# Patient Record
Sex: FEMALE | Race: WHITE | NOT HISPANIC OR LATINO | Employment: FULL TIME | RURAL
[De-identification: names, ages, dates, MRNs, and addresses within clinical notes are randomized per-mention and may not be internally consistent; named-entity substitution may affect disease eponyms.]

---

## 2022-03-28 ENCOUNTER — OFFICE VISIT (OUTPATIENT)
Dept: FAMILY MEDICINE CLINIC | Facility: CLINIC | Age: 37
End: 2022-03-28

## 2022-03-28 VITALS
WEIGHT: 293 LBS | BODY MASS INDEX: 44.41 KG/M2 | TEMPERATURE: 98.4 F | HEART RATE: 94 BPM | SYSTOLIC BLOOD PRESSURE: 122 MMHG | RESPIRATION RATE: 20 BRPM | DIASTOLIC BLOOD PRESSURE: 78 MMHG | OXYGEN SATURATION: 99 % | HEIGHT: 68 IN

## 2022-03-28 DIAGNOSIS — Z00.00 ROUTINE GENERAL MEDICAL EXAMINATION AT A HEALTH CARE FACILITY: ICD-10-CM

## 2022-03-28 DIAGNOSIS — F41.9 ANXIETY: ICD-10-CM

## 2022-03-28 DIAGNOSIS — Z11.59 NEED FOR HEPATITIS C SCREENING TEST: Primary | ICD-10-CM

## 2022-03-28 PROBLEM — G47.30 SLEEP APNEA: Status: ACTIVE | Noted: 2022-03-28

## 2022-03-28 PROCEDURE — 99203 OFFICE O/P NEW LOW 30 MIN: CPT | Performed by: FAMILY MEDICINE

## 2022-03-28 RX ORDER — CITALOPRAM 20 MG/1
20 TABLET ORAL EVERY MORNING
Qty: 30 TABLET | Refills: 5 | Status: SHIPPED | OUTPATIENT
Start: 2022-03-28 | End: 2022-09-28 | Stop reason: SDUPTHER

## 2022-03-28 NOTE — PROGRESS NOTES
"Miley Deejay    1985    Chief Complaint   Patient presents with   • Establish Care     New Patient needing Bp control due to work stress       Vitals:    03/28/22 1240   BP: 122/78   BP Location: Left arm   Patient Position: Sitting   Cuff Size: Large Adult   Pulse: 94   Resp: 20   Temp: 98.4 °F (36.9 °C)   TempSrc: Temporal   SpO2: 99%   Weight: (!) 142 kg (314 lb)   Height: 172.7 cm (68\")   PainSc: 0-No pain       36-year-old morbidly obese patient in high stress full job with anxiety      Review of Systems   Respiratory: Positive for apnea.         Sleep apnea uses CPAP with compliance can do that uses it every night will need to rest for life because of the weight issue   Cardiovascular: Negative for chest pain and leg swelling.   Genitourinary:        GYN checkup 2 months ago   Neurological: Negative for dizziness and headaches.   Psychiatric/Behavioral: Negative for suicidal ideas. The patient is nervous/anxious.        Past Medical History:   Diagnosis Date   • Other sleep apnea          Current Outpatient Medications:   •  citalopram (CeleXA) 20 MG tablet, Take 1 tablet by mouth Every Morning., Disp: 30 tablet, Rfl: 5    No Known Allergies    Patient Active Problem List   Diagnosis   • Sleep apnea       Social History     Socioeconomic History   • Marital status:    Tobacco Use   • Smoking status: Former Smoker     Packs/day: 1.00     Years: 13.00     Pack years: 13.00     Types: Cigarettes   • Smokeless tobacco: Never Used   Vaping Use   • Vaping Use: Never used   Substance and Sexual Activity   • Alcohol use: Yes     Comment: couple drinks a year   • Drug use: Never   • Sexual activity: Defer     Partners: Female, Male       Past Surgical History:   Procedure Laterality Date   • KNEE CARTILAGE SURGERY Right        Physical Exam  Vitals and nursing note reviewed.   Constitutional:       Appearance: She is obese.   Eyes:      Extraocular Movements: Extraocular movements intact.      Pupils: " "Pupils are equal, round, and reactive to light.   Cardiovascular:      Rate and Rhythm: Normal rate and regular rhythm.   Pulmonary:      Effort: Pulmonary effort is normal.      Breath sounds: Normal breath sounds.   Musculoskeletal:      Right lower leg: No edema.      Left lower leg: No edema.   Skin:     General: Skin is warm and dry.   Neurological:      General: No focal deficit present.      Mental Status: She is alert and oriented to person, place, and time.   Psychiatric:         Behavior: Behavior normal.         Thought Content: Thought content normal.         Judgment: Judgment normal.      Comments: Mood somewhat labile         Vitals:    03/28/22 1240   BP: 122/78   BP Location: Left arm   Patient Position: Sitting   Cuff Size: Large Adult   Pulse: 94   Resp: 20   Temp: 98.4 °F (36.9 °C)   TempSrc: Temporal   SpO2: 99%   Weight: (!) 142 kg (314 lb)   Height: 172.7 cm (68\")     Patient's Body mass index is 47.74 kg/m². indicating that she is morbidly obese (BMI > 40 or > 35 with obesity - related health condition). Obesity-related health conditions include the following: obstructive sleep apnea and osteoarthritis. Obesity is unchanged. BMI is is above average; BMI management plan is completed. We discussed portion control and increasing exercise..      Diagnoses and all orders for this visit:    1. Need for hepatitis C screening test (Primary)    2. Routine general medical examination at a health care facility  -     Hepatitis C Antibody  -     TSH  -     T4, free  -     CBC & Differential  -     Comprehensive Metabolic Panel  -     Lipid Panel    3. Anxiety    Other orders  -     citalopram (CeleXA) 20 MG tablet; Take 1 tablet by mouth Every Morning.  Dispense: 30 tablet; Refill: 5        Problems Addressed this Visit    None     Visit Diagnoses     Need for hepatitis C screening test    -  Primary    Routine general medical examination at a health care facility        Relevant Orders    Hepatitis C " Antibody    TSH    T4, free    CBC & Differential    Comprehensive Metabolic Panel    Lipid Panel    Anxiety          Diagnoses       Codes Comments    Need for hepatitis C screening test    -  Primary ICD-10-CM: Z11.59  ICD-9-CM: V73.89     Routine general medical examination at a health care facility     ICD-10-CM: Z00.00  ICD-9-CM: V70.0     Anxiety     ICD-10-CM: F41.9  ICD-9-CM: 300.00           Health Maintenance:  Immunization History   Administered Date(s) Administered   • Hep B, Adolescent or Pediatric 10/24/2002   • MMR 06/14/2005   • Td 02/03/1997                  Plan above-Mediterranean diet-mother has dementia                Electronically signed by Eugenio Yang MD 03/28/2022

## 2022-03-29 LAB
ALBUMIN SERPL-MCNC: 4.2 G/DL (ref 3.5–5.2)
ALBUMIN/GLOB SERPL: 1.4 G/DL
ALP SERPL-CCNC: 144 U/L (ref 39–117)
ALT SERPL-CCNC: 16 U/L (ref 1–33)
AST SERPL-CCNC: 13 U/L (ref 1–32)
BASOPHILS # BLD AUTO: 0.03 10*3/MM3 (ref 0–0.2)
BASOPHILS NFR BLD AUTO: 0.3 % (ref 0–1.5)
BILIRUB SERPL-MCNC: 0.2 MG/DL (ref 0–1.2)
BUN SERPL-MCNC: 9 MG/DL (ref 6–20)
BUN/CREAT SERPL: 14.3 (ref 7–25)
CALCIUM SERPL-MCNC: 9.6 MG/DL (ref 8.6–10.5)
CHLORIDE SERPL-SCNC: 99 MMOL/L (ref 98–107)
CHOLEST SERPL-MCNC: 173 MG/DL (ref 0–200)
CO2 SERPL-SCNC: 26.5 MMOL/L (ref 22–29)
CREAT SERPL-MCNC: 0.63 MG/DL (ref 0.57–1)
EGFRCR SERPLBLD CKD-EPI 2021: 119.6 ML/MIN/1.73
EOSINOPHIL # BLD AUTO: 0.32 10*3/MM3 (ref 0–0.4)
EOSINOPHIL NFR BLD AUTO: 3 % (ref 0.3–6.2)
ERYTHROCYTE [DISTWIDTH] IN BLOOD BY AUTOMATED COUNT: 13.2 % (ref 12.3–15.4)
GLOBULIN SER CALC-MCNC: 3 GM/DL
GLUCOSE SERPL-MCNC: 84 MG/DL (ref 65–99)
HCT VFR BLD AUTO: 42.4 % (ref 34–46.6)
HCV AB S/CO SERPL IA: <0.1 S/CO RATIO (ref 0–0.9)
HDLC SERPL-MCNC: 42 MG/DL (ref 40–60)
HGB BLD-MCNC: 13.8 G/DL (ref 12–15.9)
IMM GRANULOCYTES # BLD AUTO: 0.03 10*3/MM3 (ref 0–0.05)
IMM GRANULOCYTES NFR BLD AUTO: 0.3 % (ref 0–0.5)
LDLC SERPL CALC-MCNC: 97 MG/DL (ref 0–100)
LYMPHOCYTES # BLD AUTO: 2.92 10*3/MM3 (ref 0.7–3.1)
LYMPHOCYTES NFR BLD AUTO: 27.4 % (ref 19.6–45.3)
MCH RBC QN AUTO: 28.5 PG (ref 26.6–33)
MCHC RBC AUTO-ENTMCNC: 32.5 G/DL (ref 31.5–35.7)
MCV RBC AUTO: 87.4 FL (ref 79–97)
MONOCYTES # BLD AUTO: 0.87 10*3/MM3 (ref 0.1–0.9)
MONOCYTES NFR BLD AUTO: 8.2 % (ref 5–12)
NEUTROPHILS # BLD AUTO: 6.49 10*3/MM3 (ref 1.7–7)
NEUTROPHILS NFR BLD AUTO: 60.8 % (ref 42.7–76)
NRBC BLD AUTO-RTO: 0 /100 WBC (ref 0–0.2)
PLATELET # BLD AUTO: 310 10*3/MM3 (ref 140–450)
POTASSIUM SERPL-SCNC: 4.4 MMOL/L (ref 3.5–5.2)
PROT SERPL-MCNC: 7.2 G/DL (ref 6–8.5)
RBC # BLD AUTO: 4.85 10*6/MM3 (ref 3.77–5.28)
SODIUM SERPL-SCNC: 139 MMOL/L (ref 136–145)
T4 FREE SERPL-MCNC: 1.03 NG/DL (ref 0.93–1.7)
TRIGL SERPL-MCNC: 197 MG/DL (ref 0–150)
TSH SERPL DL<=0.005 MIU/L-ACNC: 1.22 UIU/ML (ref 0.27–4.2)
VLDLC SERPL CALC-MCNC: 34 MG/DL (ref 5–40)
WBC # BLD AUTO: 10.66 10*3/MM3 (ref 3.4–10.8)

## 2022-04-01 ENCOUNTER — PATIENT ROUNDING (BHMG ONLY) (OUTPATIENT)
Dept: FAMILY MEDICINE CLINIC | Facility: CLINIC | Age: 37
End: 2022-04-01

## 2022-04-01 NOTE — PROGRESS NOTES
April 1, 2022    Hello, may I speak with Miley Villalba?    My name is Lisbeth    I am  with [facility]  [address], [address]  [place] [address] [phone].    Before we get started may I verify your date of birth? 1985    I am calling to officially welcome you to our practice and ask about your recent visit. Is this a good time to talk? Yes!    Tell me about your visit with us. What things went well?  I just really appreciate you all so much!!        We're always looking for ways to make our patients' experiences even better. Do you have recommendations on ways we may improve? No    Overall were you satisfied with your first visit to our practice? Yes       I appreciate you taking the time to speak with me today. Is there anything else I can do for you? No        Thank you, and have a great day.

## 2022-04-28 ENCOUNTER — OFFICE VISIT (OUTPATIENT)
Dept: FAMILY MEDICINE CLINIC | Facility: CLINIC | Age: 37
End: 2022-04-28

## 2022-04-28 VITALS
OXYGEN SATURATION: 98 % | HEART RATE: 74 BPM | BODY MASS INDEX: 44.41 KG/M2 | TEMPERATURE: 97.7 F | HEIGHT: 68 IN | WEIGHT: 293 LBS | DIASTOLIC BLOOD PRESSURE: 78 MMHG | SYSTOLIC BLOOD PRESSURE: 136 MMHG

## 2022-04-28 DIAGNOSIS — M54.50 ACUTE MIDLINE LOW BACK PAIN WITHOUT SCIATICA: Primary | ICD-10-CM

## 2022-04-28 PROCEDURE — 99213 OFFICE O/P EST LOW 20 MIN: CPT | Performed by: NURSE PRACTITIONER

## 2022-04-28 PROCEDURE — 96372 THER/PROPH/DIAG INJ SC/IM: CPT | Performed by: NURSE PRACTITIONER

## 2022-04-28 RX ORDER — PREDNISONE 10 MG/1
TABLET ORAL
Qty: 21 TABLET | Refills: 0 | Status: SHIPPED | OUTPATIENT
Start: 2022-04-28 | End: 2022-09-23

## 2022-04-28 RX ORDER — TRIAMCINOLONE ACETONIDE 40 MG/ML
40 INJECTION, SUSPENSION INTRA-ARTICULAR; INTRAMUSCULAR ONCE
Status: COMPLETED | OUTPATIENT
Start: 2022-04-28 | End: 2022-04-28

## 2022-04-28 RX ADMIN — TRIAMCINOLONE ACETONIDE 40 MG: 40 INJECTION, SUSPENSION INTRA-ARTICULAR; INTRAMUSCULAR at 12:52

## 2022-04-28 NOTE — PROGRESS NOTES
"CC: low back pain     History:  Miley Villalba is a 36 y.o. female who presents today for evaluation of the above problems.      Pain in her low mid back for two days.   No known injury.  Staying in any one position for an extended time makes the pain worse, as well as changing positions. She has tried heat, tylenol, and ibuprofen and these have not helped. She has not noticed that the pain radiates down her legs and there is no weakness in her legs.     HPI  ROS:  Review of Systems   Musculoskeletal: Positive for back pain.       No Known Allergies  Past Medical History:   Diagnosis Date   • Other sleep apnea      Past Surgical History:   Procedure Laterality Date   • KNEE CARTILAGE SURGERY Right      Family History   Problem Relation Age of Onset   • Alzheimer's disease Mother    • Diabetes Father    • Diabetes Maternal Aunt    • Diabetes Maternal Uncle    • Diabetes Paternal Aunt    • Diabetes Paternal Uncle       reports that she quit smoking about 8 years ago. Her smoking use included cigarettes. She has a 13.00 pack-year smoking history. She has never used smokeless tobacco. She reports current alcohol use. She reports that she does not use drugs.      Current Outpatient Medications:   •  citalopram (CeleXA) 20 MG tablet, Take 1 tablet by mouth Every Morning., Disp: 30 tablet, Rfl: 5  •  diclofenac (VOLTAREN) 50 MG EC tablet, Take 1 tablet by mouth 2 (Two) Times a Day., Disp: 28 tablet, Rfl: 0  •  predniSONE (DELTASONE) 10 MG (21) dose pack, Use as directed on package, Disp: 21 tablet, Rfl: 0    Current Facility-Administered Medications:   •  triamcinolone acetonide (KENALOG-40) injection 40 mg, 40 mg, Intramuscular, Once, Shivani Miller, APRN    OBJECTIVE:  /78 (BP Location: Left arm, Patient Position: Sitting, Cuff Size: Large Adult)   Pulse 74   Temp 97.7 °F (36.5 °C) (Temporal)   Ht 172.7 cm (68\")   Wt (!) 143 kg (316 lb)   SpO2 98%   Breastfeeding No   BMI 48.05 kg/m²    Physical " Exam  Vitals reviewed.   Constitutional:       Appearance: She is well-developed.   Cardiovascular:      Rate and Rhythm: Normal rate.   Pulmonary:      Effort: Pulmonary effort is normal.   Musculoskeletal:        Back:    Neurological:      Mental Status: She is alert and oriented to person, place, and time.   Psychiatric:         Behavior: Behavior normal.         Assessment/Plan    Diagnoses and all orders for this visit:    1. Acute midline low back pain without sciatica (Primary)  -     triamcinolone acetonide (KENALOG-40) injection 40 mg  -     diclofenac (VOLTAREN) 50 MG EC tablet; Take 1 tablet by mouth 2 (Two) Times a Day.  Dispense: 28 tablet; Refill: 0  -     predniSONE (DELTASONE) 10 MG (21) dose pack; Use as directed on package  Dispense: 21 tablet; Refill: 0        An After Visit Summary was printed and given to the patient at discharge.  Return if symptoms worsen or fail to improve, for Next scheduled follow up.       ARYA Jacques 4/28/22    Electronically signed.

## 2022-09-23 ENCOUNTER — OFFICE VISIT (OUTPATIENT)
Dept: FAMILY MEDICINE CLINIC | Facility: CLINIC | Age: 37
End: 2022-09-23

## 2022-09-23 VITALS
HEART RATE: 77 BPM | RESPIRATION RATE: 20 BRPM | WEIGHT: 293 LBS | BODY MASS INDEX: 44.41 KG/M2 | HEIGHT: 68 IN | OXYGEN SATURATION: 97 % | DIASTOLIC BLOOD PRESSURE: 87 MMHG | TEMPERATURE: 98.6 F | SYSTOLIC BLOOD PRESSURE: 138 MMHG

## 2022-09-23 DIAGNOSIS — R05.9 COUGH: ICD-10-CM

## 2022-09-23 DIAGNOSIS — J02.9 SORE THROAT: Primary | ICD-10-CM

## 2022-09-23 LAB
EXPIRATION DATE: NORMAL
EXPIRATION DATE: NORMAL
FLUAV AG UPPER RESP QL IA.RAPID: NOT DETECTED
FLUBV AG UPPER RESP QL IA.RAPID: NOT DETECTED
INTERNAL CONTROL: NORMAL
INTERNAL CONTROL: NORMAL
Lab: NORMAL
Lab: NORMAL
S PYO AG THROAT QL: NEGATIVE
SARS-COV-2 AG UPPER RESP QL IA.RAPID: NOT DETECTED

## 2022-09-23 PROCEDURE — 87880 STREP A ASSAY W/OPTIC: CPT | Performed by: FAMILY MEDICINE

## 2022-09-23 PROCEDURE — 87428 SARSCOV & INF VIR A&B AG IA: CPT | Performed by: FAMILY MEDICINE

## 2022-09-23 PROCEDURE — 99213 OFFICE O/P EST LOW 20 MIN: CPT | Performed by: FAMILY MEDICINE

## 2022-09-23 RX ORDER — MULTIPLE VITAMINS W/ MINERALS TAB 9MG-400MCG
1 TAB ORAL DAILY
COMMUNITY

## 2022-09-23 RX ORDER — AZITHROMYCIN 250 MG/1
TABLET, FILM COATED ORAL
Qty: 6 TABLET | Refills: 0 | Status: SHIPPED | OUTPATIENT
Start: 2022-09-23 | End: 2022-09-28

## 2022-09-23 NOTE — PROGRESS NOTES
Subjective   Miley Villalba is a 36 y.o. female.     History of Present Illness  46-year-old female with sore throat cough congestion      The following portions of the patient's history were reviewed and updated as appropriate: allergies, current medications, past family history, past medical history, past social history, past surgical history and problem list.    Review of Systems   HENT: Positive for ear pain, postnasal drip, sinus pressure and sore throat.    Respiratory: Positive for cough. Negative for shortness of breath.    Cardiovascular: Negative for chest pain and leg swelling.       Objective   Physical Exam  Vitals and nursing note reviewed.   Constitutional:       Appearance: She is obese.   HENT:      Right Ear: Tympanic membrane and ear canal normal.      Left Ear: Tympanic membrane and ear canal normal.      Mouth/Throat:      Mouth: Mucous membranes are moist.      Pharynx: Oropharynx is clear.   Cardiovascular:      Rate and Rhythm: Normal rate and regular rhythm.   Pulmonary:      Effort: Pulmonary effort is normal.      Breath sounds: Rhonchi present.   Musculoskeletal:      Right lower leg: No edema.      Left lower leg: No edema.   Neurological:      General: No focal deficit present.      Mental Status: She is alert and oriented to person, place, and time.   Psychiatric:         Mood and Affect: Mood normal.         Behavior: Behavior normal.         Thought Content: Thought content normal.         Judgment: Judgment normal.         Assessment & Plan   Diagnoses and all orders for this visit:    1. Sore throat (Primary)  -     POC Rapid Strep A    2. Cough  -     POCT SARS-CoV-2 Antigen JACQUELYN    Other orders  -     azithromycin (Zithromax Z-Francisco) 250 MG tablet; Take 2 tablets the first day, then 1 tablet daily for 4 days.  Dispense: 6 tablet; Refill: 0         Plan above-COVID strep and flu negative

## 2022-09-28 ENCOUNTER — OFFICE VISIT (OUTPATIENT)
Dept: FAMILY MEDICINE CLINIC | Facility: CLINIC | Age: 37
End: 2022-09-28

## 2022-09-28 ENCOUNTER — TELEPHONE (OUTPATIENT)
Dept: FAMILY MEDICINE CLINIC | Facility: CLINIC | Age: 37
End: 2022-09-28

## 2022-09-28 VITALS
WEIGHT: 293 LBS | HEART RATE: 73 BPM | HEIGHT: 68 IN | TEMPERATURE: 98.2 F | DIASTOLIC BLOOD PRESSURE: 68 MMHG | RESPIRATION RATE: 16 BRPM | BODY MASS INDEX: 44.41 KG/M2 | OXYGEN SATURATION: 97 % | SYSTOLIC BLOOD PRESSURE: 112 MMHG

## 2022-09-28 DIAGNOSIS — E78.2 MIXED HYPERLIPIDEMIA: ICD-10-CM

## 2022-09-28 DIAGNOSIS — F41.9 ANXIETY: Primary | ICD-10-CM

## 2022-09-28 PROCEDURE — 99213 OFFICE O/P EST LOW 20 MIN: CPT | Performed by: FAMILY MEDICINE

## 2022-09-28 RX ORDER — CITALOPRAM 40 MG/1
40 TABLET ORAL EVERY MORNING
Qty: 90 TABLET | Refills: 3 | Status: SHIPPED | OUTPATIENT
Start: 2022-09-28

## 2022-09-28 NOTE — PROGRESS NOTES
Subjective   Miley Villalba is a 36 y.o. female.     History of Present Illness  36-year-old female with extreme anxiety and mood disorder related to having to institutionalized her 15-year-old daughter-daughter has failed outpatient therapy and at risk for suicide      The following portions of the patient's history were reviewed and updated as appropriate: allergies, current medications, past family history, past medical history, past social history, past surgical history and problem list.    Review of Systems   Psychiatric/Behavioral: Negative for suicidal ideas. The patient is nervous/anxious.        Objective   Physical Exam  Vitals and nursing note reviewed.   Constitutional:       Appearance: She is obese.   Eyes:      Extraocular Movements: Extraocular movements intact.      Pupils: Pupils are equal, round, and reactive to light.   Pulmonary:      Effort: Pulmonary effort is normal.   Neurological:      General: No focal deficit present.      Mental Status: She is alert and oriented to person, place, and time.   Psychiatric:         Behavior: Behavior normal.         Thought Content: Thought content normal.         Judgment: Judgment normal.      Comments: Mood is down crying distraught about her daughter         Assessment & Plan   Diagnoses and all orders for this visit:    1. Anxiety (Primary)    Other orders  -     citalopram (CeleXA) 40 MG tablet; Take 1 tablet by mouth Every Morning.  Dispense: 90 tablet; Refill: 3       Advised flu shot-get enrolled in counseling-increase Celexa to 40 mg

## 2022-09-29 LAB
ALBUMIN SERPL-MCNC: 4.1 G/DL (ref 3.5–5.2)
ALBUMIN/GLOB SERPL: 1.5 G/DL
ALP SERPL-CCNC: 140 U/L (ref 39–117)
ALT SERPL-CCNC: 22 U/L (ref 1–33)
AST SERPL-CCNC: 18 U/L (ref 1–32)
BILIRUB SERPL-MCNC: 0.4 MG/DL (ref 0–1.2)
BUN SERPL-MCNC: 7 MG/DL (ref 6–20)
BUN/CREAT SERPL: 11.7 (ref 7–25)
CALCIUM SERPL-MCNC: 9.7 MG/DL (ref 8.6–10.5)
CHLORIDE SERPL-SCNC: 102 MMOL/L (ref 98–107)
CHOLEST SERPL-MCNC: 173 MG/DL (ref 0–200)
CO2 SERPL-SCNC: 27 MMOL/L (ref 22–29)
CREAT SERPL-MCNC: 0.6 MG/DL (ref 0.57–1)
EGFRCR SERPLBLD CKD-EPI 2021: 121 ML/MIN/1.73
GLOBULIN SER CALC-MCNC: 2.7 GM/DL
GLUCOSE SERPL-MCNC: 77 MG/DL (ref 65–99)
HDLC SERPL-MCNC: 46 MG/DL (ref 40–60)
LDLC SERPL CALC-MCNC: 93 MG/DL (ref 0–100)
POTASSIUM SERPL-SCNC: 4.2 MMOL/L (ref 3.5–5.2)
PROT SERPL-MCNC: 6.8 G/DL (ref 6–8.5)
SODIUM SERPL-SCNC: 140 MMOL/L (ref 136–145)
TRIGL SERPL-MCNC: 201 MG/DL (ref 0–150)
VLDLC SERPL CALC-MCNC: 34 MG/DL (ref 5–40)

## 2022-09-30 ENCOUNTER — PATIENT ROUNDING (BHMG ONLY) (OUTPATIENT)
Dept: FAMILY MEDICINE CLINIC | Facility: CLINIC | Age: 37
End: 2022-09-30

## 2022-09-30 NOTE — PROGRESS NOTES
September 30, 2022    Hello, may I speak with Miley Villalba?    My name is Lisbeth     I am  with [facility]  [address], [address]  [place] [address] [phone].    Before we get started may I verify your date of birth? 1985    I am calling to officially welcome you to our practice and ask about your recent visit. Is this a good time to talk? no    Tell me about your visit with us. What things went well?         We're always looking for ways to make our patients' experiences even better. Do you have recommendations on ways we may improve?      Overall were you satisfied with your first visit to our practice?        I appreciate you taking the time to speak with me today. Is there anything else I can do for you?       Thank you, and have a great day.

## 2022-10-31 ENCOUNTER — OFFICE VISIT (OUTPATIENT)
Dept: FAMILY MEDICINE CLINIC | Facility: CLINIC | Age: 37
End: 2022-10-31

## 2022-10-31 VITALS
DIASTOLIC BLOOD PRESSURE: 79 MMHG | TEMPERATURE: 98.6 F | HEART RATE: 83 BPM | WEIGHT: 293 LBS | BODY MASS INDEX: 44.41 KG/M2 | HEIGHT: 68 IN | OXYGEN SATURATION: 98 % | SYSTOLIC BLOOD PRESSURE: 115 MMHG

## 2022-10-31 DIAGNOSIS — R05.1 ACUTE COUGH: ICD-10-CM

## 2022-10-31 DIAGNOSIS — J10.1 INFLUENZA A: Primary | ICD-10-CM

## 2022-10-31 LAB
EXPIRATION DATE: ABNORMAL
FLUAV AG UPPER RESP QL IA.RAPID: DETECTED
FLUBV AG UPPER RESP QL IA.RAPID: NOT DETECTED
INTERNAL CONTROL: ABNORMAL
Lab: ABNORMAL
SARS-COV-2 AG UPPER RESP QL IA.RAPID: NOT DETECTED

## 2022-10-31 PROCEDURE — 87428 SARSCOV & INF VIR A&B AG IA: CPT | Performed by: NURSE PRACTITIONER

## 2022-10-31 PROCEDURE — 96372 THER/PROPH/DIAG INJ SC/IM: CPT | Performed by: NURSE PRACTITIONER

## 2022-10-31 PROCEDURE — 99213 OFFICE O/P EST LOW 20 MIN: CPT | Performed by: NURSE PRACTITIONER

## 2022-10-31 RX ORDER — OSELTAMIVIR PHOSPHATE 75 MG/1
75 CAPSULE ORAL 2 TIMES DAILY
Qty: 10 CAPSULE | Refills: 0 | Status: SHIPPED | OUTPATIENT
Start: 2022-10-31 | End: 2022-11-04

## 2022-10-31 RX ORDER — PREDNISONE 10 MG/1
TABLET ORAL
Qty: 21 TABLET | Refills: 0 | Status: SHIPPED | OUTPATIENT
Start: 2022-10-31 | End: 2022-11-04

## 2022-10-31 RX ORDER — AZITHROMYCIN 250 MG/1
TABLET, FILM COATED ORAL
Qty: 6 TABLET | Refills: 0 | Status: SHIPPED | OUTPATIENT
Start: 2022-10-31 | End: 2022-11-04

## 2022-10-31 RX ORDER — TRIAMCINOLONE ACETONIDE 40 MG/ML
40 INJECTION, SUSPENSION INTRA-ARTICULAR; INTRAMUSCULAR ONCE
Status: COMPLETED | OUTPATIENT
Start: 2022-10-31 | End: 2022-10-31

## 2022-10-31 RX ORDER — NEBULIZER ACCESSORIES
1 KIT MISCELLANEOUS ONCE
Qty: 1 EACH | Refills: 0 | Status: SHIPPED | OUTPATIENT
Start: 2022-10-31 | End: 2022-10-31

## 2022-10-31 RX ADMIN — TRIAMCINOLONE ACETONIDE 40 MG: 40 INJECTION, SUSPENSION INTRA-ARTICULAR; INTRAMUSCULAR at 13:41

## 2022-10-31 NOTE — PROGRESS NOTES
CC: cough, congestion - exposure to flu    History:  Miley Villalba is a 36 y.o. female who presents today for evaluation of the above problems.      Symptoms started on Saturday. Multiple people in the home currently have influenza A. She complains of cough, congestion, chills, body aches and shortness of breath. States that it burns in her chest to take a deep breath.     HPI  ROS:  Review of Systems   Constitutional: Positive for chills and fever.   HENT: Positive for congestion.    Respiratory: Positive for cough and shortness of breath.    Musculoskeletal: Positive for myalgias.       No Known Allergies  Past Medical History:   Diagnosis Date   • Other sleep apnea      Past Surgical History:   Procedure Laterality Date   • KNEE CARTILAGE SURGERY Right      Family History   Problem Relation Age of Onset   • Alzheimer's disease Mother    • Diabetes Father    • Diabetes Maternal Aunt    • Diabetes Maternal Uncle    • Diabetes Paternal Aunt    • Diabetes Paternal Uncle       reports that she quit smoking about 8 years ago. Her smoking use included cigarettes. She has a 13.00 pack-year smoking history. She has never used smokeless tobacco. She reports current alcohol use. She reports that she does not use drugs.      Current Outpatient Medications:   •  citalopram (CeleXA) 40 MG tablet, Take 1 tablet by mouth Every Morning., Disp: 90 tablet, Rfl: 3  •  multivitamin with minerals tablet tablet, Take 1 tablet by mouth Daily., Disp: , Rfl:   •  azithromycin (Zithromax) 250 MG tablet, Take 2 tablets the first day, then 1 tablet daily for 4 days., Disp: 6 tablet, Rfl: 0  •  oseltamivir (Tamiflu) 75 MG capsule, Take 1 capsule by mouth 2 (Two) Times a Day for 5 days., Disp: 10 capsule, Rfl: 0  •  predniSONE (DELTASONE) 10 MG (21) dose pack, Use as directed on package, Disp: 21 tablet, Rfl: 0  •  Respiratory Therapy Supplies (Nebulizer/Tubing/Mouthpiece) kit, 1 application 1 (One) Time for 1 dose., Disp: 1 each, Rfl: 0  No  "current facility-administered medications for this visit.    OBJECTIVE:  /79 (BP Location: Right arm, Patient Position: Sitting, Cuff Size: Large Adult)   Pulse 83   Temp 98.6 °F (37 °C) (Temporal)   Ht 172.7 cm (68\") Comment: pt reported  Wt (!) 145 kg (320 lb) Comment: pt reported  SpO2 98%   BMI 48.66 kg/m²    Physical Exam  Vitals reviewed.   Constitutional:       Appearance: She is well-developed.   Cardiovascular:      Rate and Rhythm: Normal rate and regular rhythm.      Heart sounds: Normal heart sounds.   Pulmonary:      Effort: Pulmonary effort is normal.      Breath sounds: Normal breath sounds.   Neurological:      Mental Status: She is alert and oriented to person, place, and time.   Psychiatric:         Behavior: Behavior normal.         Assessment/Plan    Diagnoses and all orders for this visit:    1. Influenza A (Primary)  -     POCT SARS-CoV-2 Antigen JACQUELYN + Flu  -     Respiratory Therapy Supplies (Nebulizer/Tubing/Mouthpiece) kit; 1 application 1 (One) Time for 1 dose.  Dispense: 1 each; Refill: 0  -     triamcinolone acetonide (KENALOG-40) injection 40 mg  -     oseltamivir (Tamiflu) 75 MG capsule; Take 1 capsule by mouth 2 (Two) Times a Day for 5 days.  Dispense: 10 capsule; Refill: 0  -     azithromycin (Zithromax) 250 MG tablet; Take 2 tablets the first day, then 1 tablet daily for 4 days.  Dispense: 6 tablet; Refill: 0  -     predniSONE (DELTASONE) 10 MG (21) dose pack; Use as directed on package  Dispense: 21 tablet; Refill: 0    2. Acute cough  -     POCT SARS-CoV-2 Antigen JACQUELYN + Flu  -     triamcinolone acetonide (KENALOG-40) injection 40 mg        An After Visit Summary was printed and given to the patient at discharge.  Return if symptoms worsen or fail to improve, for Next scheduled follow up.       ARYA Jacques 10/31/22    Electronically signed.  "

## 2022-11-03 ENCOUNTER — TELEPHONE (OUTPATIENT)
Dept: FAMILY MEDICINE CLINIC | Facility: CLINIC | Age: 37
End: 2022-11-03

## 2022-11-03 RX ORDER — ALBUTEROL SULFATE 1.25 MG/3ML
1 SOLUTION RESPIRATORY (INHALATION) EVERY 4 HOURS PRN
Qty: 30 EACH | Refills: 2 | Status: SHIPPED | OUTPATIENT
Start: 2022-11-03 | End: 2023-02-21

## 2022-11-03 NOTE — TELEPHONE ENCOUNTER
Caller: Miley Villalba    Relationship: Self    Best call back number: [phone]    What medication are you requesting: MEDICATION FOR BREATHING TREATMENTS     Have you had these symptoms before:    [x] Yes  [] No    Have you been treated for these symptoms before:   [x] Yes  [] No    If a prescription is needed, what is your preferred pharmacy and phone number: [facility] [address] [place] [address].[facility] [address] - [phone] [facility] [phone] FX     Additional notes:    PATIENT STATES SHE THOUGHT SHE HAD ENOUGH AT HOME BUT IS ALMOST OUT

## 2022-11-03 NOTE — TELEPHONE ENCOUNTER
Spoke with patient.  She has been using medication she had for the kids - albuterol.  Needs script for her sent to .

## 2022-11-04 ENCOUNTER — OFFICE VISIT (OUTPATIENT)
Dept: FAMILY MEDICINE CLINIC | Facility: CLINIC | Age: 37
End: 2022-11-04

## 2022-11-04 VITALS
BODY MASS INDEX: 44.41 KG/M2 | HEIGHT: 68 IN | HEART RATE: 75 BPM | SYSTOLIC BLOOD PRESSURE: 127 MMHG | WEIGHT: 293 LBS | TEMPERATURE: 97.8 F | OXYGEN SATURATION: 98 % | DIASTOLIC BLOOD PRESSURE: 66 MMHG

## 2022-11-04 DIAGNOSIS — R05.1 ACUTE COUGH: Primary | ICD-10-CM

## 2022-11-04 PROCEDURE — 99213 OFFICE O/P EST LOW 20 MIN: CPT | Performed by: NURSE PRACTITIONER

## 2022-11-04 RX ORDER — CEFDINIR 300 MG/1
300 CAPSULE ORAL 2 TIMES DAILY
Qty: 14 CAPSULE | Refills: 0 | Status: SHIPPED | OUTPATIENT
Start: 2022-11-04 | End: 2022-11-11

## 2022-11-04 RX ORDER — PREDNISONE 20 MG/1
TABLET ORAL
Qty: 9 TABLET | Refills: 0 | Status: SHIPPED | OUTPATIENT
Start: 2022-11-04 | End: 2023-02-21

## 2022-11-04 NOTE — PROGRESS NOTES
CC: cough/wheezing    History:  Miley Villalba is a 36 y.o. female who presents today for evaluation of the above problems.      Patient was diagnosed with influenza A on October 31.  While she is now afebrile she continues to have a significant cough and wheezing with shortness of breath and burning in her chest.  She has already completed a Z-Francisco and a prednisone Dosepak.  She did also complete a course of Tamiflu.      HPI  ROS:  Review of Systems   Constitutional: Negative for fever.   Respiratory: Positive for cough, shortness of breath and wheezing.        No Known Allergies  Past Medical History:   Diagnosis Date   • Other sleep apnea      Past Surgical History:   Procedure Laterality Date   • KNEE CARTILAGE SURGERY Right      Family History   Problem Relation Age of Onset   • Alzheimer's disease Mother    • Diabetes Father    • Diabetes Maternal Aunt    • Diabetes Maternal Uncle    • Diabetes Paternal Aunt    • Diabetes Paternal Uncle       reports that she quit smoking about 8 years ago. Her smoking use included cigarettes. She has a 13.00 pack-year smoking history. She has never used smokeless tobacco. She reports current alcohol use. She reports that she does not use drugs.      Current Outpatient Medications:   •  albuterol (ACCUNEB) 1.25 MG/3ML nebulizer solution, Take 3 mL by nebulization Every 4 (Four) Hours As Needed for Wheezing., Disp: 30 each, Rfl: 2  •  citalopram (CeleXA) 40 MG tablet, Take 1 tablet by mouth Every Morning., Disp: 90 tablet, Rfl: 3  •  multivitamin with minerals tablet tablet, Take 1 tablet by mouth Daily., Disp: , Rfl:   •  cefdinir (OMNICEF) 300 MG capsule, Take 1 capsule by mouth 2 (Two) Times a Day for 7 days., Disp: 14 capsule, Rfl: 0  •  predniSONE (DELTASONE) 20 MG tablet, Take two tablets daily for 3 days and then one daily until gone., Disp: 9 tablet, Rfl: 0    OBJECTIVE:  /66 (BP Location: Right arm, Patient Position: Sitting, Cuff Size: Large Adult)   Pulse 75   " Temp 97.8 °F (36.6 °C) (Temporal)   Ht 172.7 cm (68\")   Wt (!) 147 kg (323 lb 9.6 oz)   SpO2 98%   BMI 49.20 kg/m²    Physical Exam  Vitals reviewed.   Constitutional:       Appearance: She is well-developed.   Cardiovascular:      Rate and Rhythm: Normal rate and regular rhythm.      Heart sounds: Normal heart sounds.   Pulmonary:      Effort: Pulmonary effort is normal.      Breath sounds: Wheezing present.   Neurological:      Mental Status: She is alert and oriented to person, place, and time.   Psychiatric:         Behavior: Behavior normal.         Assessment/Plan    Diagnoses and all orders for this visit:    1. Acute cough (Primary)  -     XR Chest PA & Lateral; Future  -     cefdinir (OMNICEF) 300 MG capsule; Take 1 capsule by mouth 2 (Two) Times a Day for 7 days.  Dispense: 14 capsule; Refill: 0  -     predniSONE (DELTASONE) 20 MG tablet; Take two tablets daily for 3 days and then one daily until gone.  Dispense: 9 tablet; Refill: 0        An After Visit Summary was printed and given to the patient at discharge.  Return if symptoms worsen or fail to improve, for Next scheduled follow up.       Shivani Miller, ARYA 11/4/22    Electronically signed.  "

## 2023-02-21 ENCOUNTER — OFFICE VISIT (OUTPATIENT)
Dept: FAMILY MEDICINE CLINIC | Facility: CLINIC | Age: 38
End: 2023-02-21
Payer: COMMERCIAL

## 2023-02-21 VITALS
RESPIRATION RATE: 20 BRPM | DIASTOLIC BLOOD PRESSURE: 80 MMHG | HEIGHT: 68 IN | SYSTOLIC BLOOD PRESSURE: 130 MMHG | OXYGEN SATURATION: 99 % | BODY MASS INDEX: 44.41 KG/M2 | WEIGHT: 293 LBS | HEART RATE: 68 BPM

## 2023-02-21 DIAGNOSIS — R07.89 OTHER CHEST PAIN: ICD-10-CM

## 2023-02-21 DIAGNOSIS — F41.9 ANXIETY: Primary | ICD-10-CM

## 2023-02-21 PROCEDURE — 99213 OFFICE O/P EST LOW 20 MIN: CPT | Performed by: FAMILY MEDICINE

## 2023-02-21 PROCEDURE — 93000 ELECTROCARDIOGRAM COMPLETE: CPT | Performed by: FAMILY MEDICINE

## 2023-02-21 RX ORDER — PRAZOSIN HYDROCHLORIDE 1 MG/1
1 CAPSULE ORAL NIGHTLY
Qty: 30 CAPSULE | Refills: 1 | Status: SHIPPED | OUTPATIENT
Start: 2023-02-21 | End: 2023-03-27 | Stop reason: SDUPTHER

## 2023-02-21 NOTE — PROGRESS NOTES
Subjective   Miley Villalba is a 37 y.o. female.     History of Present Illness  37-year-old female with anxiety chest discomfort nightmares      The following portions of the patient's history were reviewed and updated as appropriate: allergies, current medications, past family history, past medical history, past social history, past surgical history and problem list.    Review of Systems   Respiratory: Negative for shortness of breath.    Cardiovascular: Negative for chest pain and leg swelling.   Psychiatric/Behavioral: Positive for sleep disturbance. The patient is nervous/anxious.         Seeing therapist-suggested adding Minipress       Objective   Physical Exam  Vitals and nursing note reviewed.   Constitutional:       Appearance: She is obese.   Cardiovascular:      Rate and Rhythm: Normal rate and regular rhythm.   Pulmonary:      Effort: Pulmonary effort is normal.      Breath sounds: Normal breath sounds.   Musculoskeletal:      Right lower leg: No edema.      Left lower leg: No edema.   Skin:     General: Skin is warm and dry.   Neurological:      General: No focal deficit present.      Mental Status: She is alert and oriented to person, place, and time.   Psychiatric:         Behavior: Behavior normal.         Thought Content: Thought content normal.         Judgment: Judgment normal.         Assessment & Plan   Diagnoses and all orders for this visit:    1. Anxiety (Primary)  -     ECG 12 Lead    2. Other chest pain  -     ECG 12 Lead    Other orders  -     prazosin (MINIPRESS) 1 MG capsule; Take 1 capsule by mouth Every Night.  Dispense: 30 capsule; Refill: 1    Plan above-EKG normal-none for comparison-Minipress trial for nightmares as suggested by therapist-see back 1 month

## 2023-03-27 ENCOUNTER — OFFICE VISIT (OUTPATIENT)
Dept: FAMILY MEDICINE CLINIC | Facility: CLINIC | Age: 38
End: 2023-03-27
Payer: COMMERCIAL

## 2023-03-27 VITALS
TEMPERATURE: 97.4 F | WEIGHT: 293 LBS | HEIGHT: 68 IN | BODY MASS INDEX: 44.41 KG/M2 | RESPIRATION RATE: 18 BRPM | SYSTOLIC BLOOD PRESSURE: 126 MMHG | DIASTOLIC BLOOD PRESSURE: 78 MMHG | HEART RATE: 97 BPM | OXYGEN SATURATION: 98 %

## 2023-03-27 DIAGNOSIS — F41.9 ANXIETY: Primary | ICD-10-CM

## 2023-03-27 RX ORDER — PRAZOSIN HYDROCHLORIDE 1 MG/1
1 CAPSULE ORAL NIGHTLY
Qty: 90 CAPSULE | Refills: 3 | Status: SHIPPED | OUTPATIENT
Start: 2023-03-27

## 2023-03-27 NOTE — PROGRESS NOTES
Subjective   Miley Villalba is a 37 y.o. female.     History of Present Illness  37-year-old female follow-up for anxiety specially at night with nightmares      The following portions of the patient's history were reviewed and updated as appropriate: allergies, current medications, past family history, past medical history, past social history, past surgical history and problem list.    Review of Systems   Respiratory: Negative for shortness of breath.    Cardiovascular: Negative for chest pain.   Gastrointestinal:        Left upper quadrant fullness patient complains of no pain etc.-lipoma versus early umbilical hernia   Psychiatric/Behavioral: Negative for behavioral problems and suicidal ideas. The patient is nervous/anxious.         Nightmares       Objective   Physical Exam  Vitals and nursing note reviewed.   Constitutional:       Appearance: She is obese.   Eyes:      Extraocular Movements: Extraocular movements intact.      Pupils: Pupils are equal, round, and reactive to light.   Cardiovascular:      Rate and Rhythm: Normal rate.   Pulmonary:      Effort: Pulmonary effort is normal.      Breath sounds: Normal breath sounds.   Musculoskeletal:      Right lower leg: No edema.      Left lower leg: No edema.   Skin:     General: Skin is warm and dry.   Neurological:      General: No focal deficit present.      Mental Status: She is alert and oriented to person, place, and time.   Psychiatric:         Mood and Affect: Mood normal.         Behavior: Behavior normal.         Thought Content: Thought content normal.         Judgment: Judgment normal.         Assessment & Plan   Diagnoses and all orders for this visit:    1. Anxiety (Primary)    Other orders  -     prazosin (MINIPRESS) 1 MG capsule; Take 1 capsule by mouth Every Night.  Dispense: 90 capsule; Refill: 3       Plan above-continue present regimen

## 2023-05-30 ENCOUNTER — APPOINTMENT (OUTPATIENT)
Dept: CT IMAGING | Facility: HOSPITAL | Age: 38
End: 2023-05-30
Payer: COMMERCIAL

## 2023-05-30 ENCOUNTER — OFFICE VISIT (OUTPATIENT)
Dept: FAMILY MEDICINE CLINIC | Facility: CLINIC | Age: 38
End: 2023-05-30

## 2023-05-30 ENCOUNTER — HOSPITAL ENCOUNTER (EMERGENCY)
Facility: HOSPITAL | Age: 38
Discharge: HOME | End: 2023-05-30
Attending: FAMILY MEDICINE | Admitting: FAMILY MEDICINE
Payer: COMMERCIAL

## 2023-05-30 ENCOUNTER — APPOINTMENT (OUTPATIENT)
Dept: GENERAL RADIOLOGY | Facility: HOSPITAL | Age: 38
End: 2023-05-30
Payer: COMMERCIAL

## 2023-05-30 VITALS
SYSTOLIC BLOOD PRESSURE: 139 MMHG | OXYGEN SATURATION: 100 % | TEMPERATURE: 98.2 F | BODY MASS INDEX: 44.41 KG/M2 | HEIGHT: 68 IN | WEIGHT: 293 LBS | HEART RATE: 99 BPM | RESPIRATION RATE: 18 BRPM | DIASTOLIC BLOOD PRESSURE: 98 MMHG

## 2023-05-30 VITALS
TEMPERATURE: 97.4 F | RESPIRATION RATE: 18 BRPM | OXYGEN SATURATION: 98 % | HEIGHT: 68 IN | WEIGHT: 293 LBS | SYSTOLIC BLOOD PRESSURE: 112 MMHG | DIASTOLIC BLOOD PRESSURE: 68 MMHG | BODY MASS INDEX: 44.41 KG/M2 | HEART RATE: 67 BPM

## 2023-05-30 DIAGNOSIS — R52 PAIN: ICD-10-CM

## 2023-05-30 DIAGNOSIS — R11.2 NAUSEA AND VOMITING, UNSPECIFIED VOMITING TYPE: ICD-10-CM

## 2023-05-30 DIAGNOSIS — R53.83 OTHER FATIGUE: Primary | ICD-10-CM

## 2023-05-30 DIAGNOSIS — R07.9 CHEST PAIN, UNSPECIFIED TYPE: Primary | ICD-10-CM

## 2023-05-30 LAB
ANION GAP SERPL CALCULATED.3IONS-SCNC: 10 MMOL/L (ref 5–15)
BASOPHILS # BLD AUTO: 0.04 10*3/MM3 (ref 0–0.2)
BASOPHILS NFR BLD AUTO: 0.4 % (ref 0–1.5)
BILIRUB BLD-MCNC: NEGATIVE MG/DL
BUN SERPL-MCNC: 13 MG/DL (ref 6–20)
BUN/CREAT SERPL: 21.3 (ref 7–25)
CALCIUM SPEC-SCNC: 9.2 MG/DL (ref 8.6–10.5)
CHLORIDE SERPL-SCNC: 103 MMOL/L (ref 98–107)
CLARITY, POC: CLEAR
CO2 SERPL-SCNC: 24 MMOL/L (ref 22–29)
COLOR UR: YELLOW
CREAT SERPL-MCNC: 0.61 MG/DL (ref 0.57–1)
D DIMER PPP FEU-MCNC: 0.65 MCGFEU/ML (ref 0–0.5)
DEPRECATED RDW RBC AUTO: 40.4 FL (ref 37–54)
EGFRCR SERPLBLD CKD-EPI 2021: 119.7 ML/MIN/1.73
EOSINOPHIL # BLD AUTO: 0.41 10*3/MM3 (ref 0–0.4)
EOSINOPHIL NFR BLD AUTO: 3.9 % (ref 0.3–6.2)
ERYTHROCYTE [DISTWIDTH] IN BLOOD BY AUTOMATED COUNT: 13.7 % (ref 12.3–15.4)
GLUCOSE SERPL-MCNC: 97 MG/DL (ref 65–99)
GLUCOSE UR STRIP-MCNC: NEGATIVE MG/DL
HCT VFR BLD AUTO: 38 % (ref 34–46.6)
HGB BLD-MCNC: 12.1 G/DL (ref 12–15.9)
HOLD SPECIMEN: NORMAL
HOLD SPECIMEN: NORMAL
IMM GRANULOCYTES # BLD AUTO: 0.03 10*3/MM3 (ref 0–0.05)
IMM GRANULOCYTES NFR BLD AUTO: 0.3 % (ref 0–0.5)
KETONES UR QL: NEGATIVE
LEUKOCYTE EST, POC: NEGATIVE
LYMPHOCYTES # BLD AUTO: 2.57 10*3/MM3 (ref 0.7–3.1)
LYMPHOCYTES NFR BLD AUTO: 24.4 % (ref 19.6–45.3)
MAGNESIUM SERPL-MCNC: 1.8 MG/DL (ref 1.6–2.6)
MCH RBC QN AUTO: 26.1 PG (ref 26.6–33)
MCHC RBC AUTO-ENTMCNC: 31.8 G/DL (ref 31.5–35.7)
MCV RBC AUTO: 82.1 FL (ref 79–97)
MONOCYTES # BLD AUTO: 0.75 10*3/MM3 (ref 0.1–0.9)
MONOCYTES NFR BLD AUTO: 7.1 % (ref 5–12)
NEUTROPHILS NFR BLD AUTO: 6.75 10*3/MM3 (ref 1.7–7)
NEUTROPHILS NFR BLD AUTO: 63.9 % (ref 42.7–76)
NITRITE UR-MCNC: NEGATIVE MG/ML
NRBC BLD AUTO-RTO: 0 /100 WBC (ref 0–0.2)
PH UR: 7 [PH] (ref 5–8)
PLATELET # BLD AUTO: 293 10*3/MM3 (ref 140–450)
PMV BLD AUTO: 9.2 FL (ref 6–12)
POTASSIUM SERPL-SCNC: 3.8 MMOL/L (ref 3.5–5.2)
PROT UR STRIP-MCNC: NEGATIVE MG/DL
RBC # BLD AUTO: 4.63 10*6/MM3 (ref 3.77–5.28)
RBC # UR STRIP: NEGATIVE /UL
SODIUM SERPL-SCNC: 137 MMOL/L (ref 136–145)
SP GR UR: 1.01 (ref 1–1.03)
TROPONIN T SERPL HS-MCNC: <6 NG/L
UROBILINOGEN UR QL: NORMAL
WBC NRBC COR # BLD: 10.55 10*3/MM3 (ref 3.4–10.8)
WHOLE BLOOD HOLD COAG: NORMAL
WHOLE BLOOD HOLD SPECIMEN: NORMAL

## 2023-05-30 PROCEDURE — 93005 ELECTROCARDIOGRAM TRACING: CPT | Performed by: FAMILY MEDICINE

## 2023-05-30 PROCEDURE — 85379 FIBRIN DEGRADATION QUANT: CPT | Performed by: FAMILY MEDICINE

## 2023-05-30 PROCEDURE — 99283 EMERGENCY DEPT VISIT LOW MDM: CPT

## 2023-05-30 PROCEDURE — 80048 BASIC METABOLIC PNL TOTAL CA: CPT | Performed by: FAMILY MEDICINE

## 2023-05-30 PROCEDURE — 71275 CT ANGIOGRAPHY CHEST: CPT

## 2023-05-30 PROCEDURE — 71045 X-RAY EXAM CHEST 1 VIEW: CPT

## 2023-05-30 PROCEDURE — 85025 COMPLETE CBC W/AUTO DIFF WBC: CPT | Performed by: FAMILY MEDICINE

## 2023-05-30 PROCEDURE — 36415 COLL VENOUS BLD VENIPUNCTURE: CPT

## 2023-05-30 PROCEDURE — 83735 ASSAY OF MAGNESIUM: CPT | Performed by: FAMILY MEDICINE

## 2023-05-30 PROCEDURE — 25510000001 IOPAMIDOL PER 1 ML: Performed by: FAMILY MEDICINE

## 2023-05-30 PROCEDURE — 99282 EMERGENCY DEPT VISIT SF MDM: CPT

## 2023-05-30 PROCEDURE — 25010000002 ONDANSETRON PER 1 MG: Performed by: FAMILY MEDICINE

## 2023-05-30 PROCEDURE — 84484 ASSAY OF TROPONIN QUANT: CPT | Performed by: FAMILY MEDICINE

## 2023-05-30 PROCEDURE — 96374 THER/PROPH/DIAG INJ IV PUSH: CPT

## 2023-05-30 RX ORDER — SODIUM CHLORIDE 0.9 % (FLUSH) 0.9 %
10 SYRINGE (ML) INJECTION AS NEEDED
Status: DISCONTINUED | OUTPATIENT
Start: 2023-05-30 | End: 2023-05-30 | Stop reason: HOSPADM

## 2023-05-30 RX ORDER — ONDANSETRON 2 MG/ML
4 INJECTION INTRAMUSCULAR; INTRAVENOUS ONCE
Status: COMPLETED | OUTPATIENT
Start: 2023-05-30 | End: 2023-05-30

## 2023-05-30 RX ORDER — ONDANSETRON 4 MG/1
4 TABLET, ORALLY DISINTEGRATING ORAL EVERY 8 HOURS PRN
Qty: 12 TABLET | Refills: 0 | Status: SHIPPED | OUTPATIENT
Start: 2023-05-30

## 2023-05-30 RX ADMIN — ONDANSETRON 4 MG: 2 INJECTION INTRAMUSCULAR; INTRAVENOUS at 18:33

## 2023-05-30 RX ADMIN — SODIUM CHLORIDE 1000 ML: 9 INJECTION, SOLUTION INTRAVENOUS at 18:33

## 2023-05-30 RX ADMIN — IOPAMIDOL 100 ML: 755 INJECTION, SOLUTION INTRAVENOUS at 19:16

## 2023-05-30 NOTE — PROGRESS NOTES
Subjective   Miley Villalba is a 37 y.o. female.     History of Present Illness  37-year-old morbidly obese female with left upper quadrant/posterior upper back pain      The following portions of the patient's history were reviewed and updated as appropriate: allergies, current medications, past family history, past medical history, past social history, past surgical history and problem list.    Review of Systems   Respiratory: Negative for shortness of breath.    Cardiovascular: Positive for chest pain and leg swelling.   Gastrointestinal: Positive for nausea and vomiting.       Objective   Physical Exam  Vitals and nursing note reviewed.   Constitutional:       Appearance: She is obese.   Cardiovascular:      Rate and Rhythm: Normal rate and regular rhythm.   Pulmonary:      Effort: Pulmonary effort is normal.      Breath sounds: Normal breath sounds.   Abdominal:      Tenderness: There is no right CVA tenderness or left CVA tenderness.   Musculoskeletal:      Right lower leg: Edema present.      Left lower leg: Edema present.   Skin:     Findings: Bruising present.   Neurological:      General: No focal deficit present.      Mental Status: She is alert and oriented to person, place, and time.   Psychiatric:         Mood and Affect: Mood normal.         Behavior: Behavior normal.         Thought Content: Thought content normal.         Judgment: Judgment normal.         Assessment & Plan   Diagnoses and all orders for this visit:    1. Other fatigue (Primary)  -     POC Urinalysis Dipstick, Multipro    2. Pain       Urinalysis totally normal- plan urgent ER visit-called Elver Bledsoe-rule out thromboembolic disease

## 2023-05-30 NOTE — ED PROVIDER NOTES
Subjective   History of Present Illness  37-year-old female states that she has been having a sharp pain that is started in the left side of her chest and radiating to her back.  Patient states that she has had a couple episodes of vomiting going on since yesterday.  Patient has no shortness of breath.  Patient has no fevers or chills.  Patient has no cough.  Patient denies any other symptoms at this time.  Patient states that she was at her primary care provider's office today.  Patient states that they sent her here for evaluation of a possible PE.    Review of Systems   Cardiovascular:  Positive for chest pain.   Gastrointestinal:  Positive for vomiting.   All other systems reviewed and are negative.    Past Medical History:   Diagnosis Date    Other sleep apnea        No Known Allergies    Past Surgical History:   Procedure Laterality Date    KNEE CARTILAGE SURGERY Right        Family History   Problem Relation Age of Onset    Alzheimer's disease Mother     Diabetes Father     Diabetes Maternal Aunt     Diabetes Maternal Uncle     Diabetes Paternal Aunt     Diabetes Paternal Uncle        Social History     Socioeconomic History    Marital status:    Tobacco Use    Smoking status: Former     Packs/day: 1.00     Years: 13.00     Pack years: 13.00     Types: Cigarettes     Quit date:      Years since quittin.4    Smokeless tobacco: Never   Vaping Use    Vaping Use: Never used   Substance and Sexual Activity    Alcohol use: Yes     Comment: couple drinks a year    Drug use: Never    Sexual activity: Defer     Partners: Female, Male           Objective   Physical Exam  Vitals and nursing note reviewed.   Constitutional:       Appearance: Normal appearance.   HENT:      Head: Normocephalic and atraumatic.   Cardiovascular:      Rate and Rhythm: Normal rate and regular rhythm.      Heart sounds: Normal heart sounds.   Pulmonary:      Effort: Pulmonary effort is normal.      Breath sounds: Normal breath  sounds.   Chest:      Chest wall: Tenderness present.   Abdominal:      General: Bowel sounds are normal.      Palpations: Abdomen is soft.      Tenderness: There is no abdominal tenderness.   Skin:     General: Skin is warm and dry.   Neurological:      General: No focal deficit present.      Mental Status: She is alert and oriented to person, place, and time.   Psychiatric:         Mood and Affect: Mood normal.         Behavior: Behavior normal.       Procedures           ED Course  ED Course as of 05/30/23 1954   Tue May 30, 2023   1833 EKG rate 68  Sinus rhythm  No STEMI [RP]      ED Course User Index  [RP] Doe Andrews MD                      HEART Score: 1                    Lab Results (last 24 hours)       Procedure Component Value Units Date/Time    POC Urinalysis Dipstick, Multipro [[id number]]  (Normal) Collected: 05/30/23 1456    Specimen: Urine Updated: 05/30/23 1456     Color Yellow     Clarity, UA Clear     Glucose, UA Negative mg/dL      Bilirubin Negative     Ketones, UA Negative     Specific Gravity  1.015     Blood, UA Negative     pH, Urine 7.0     Protein, POC Negative mg/dL      Urobilinogen, UA Normal     Nitrite, UA Negative     Leukocytes Negative    CBC & Differential [[id number]]  (Abnormal) Collected: 05/30/23 1740    Specimen: Blood Updated: 05/30/23 1817    Narrative:      The following orders were created for panel order CBC & Differential.  Procedure                               Abnormality         Status                     ---------                               -----------         ------                     CBC Auto Differential[[id number]]        Abnormal            Final result                 Please view results for these tests on the individual orders.    Basic Metabolic Panel [[id number]]  (Normal) Collected: 05/30/23 1740    Specimen: Blood Updated: 05/30/23 1829     Glucose 97 mg/dL      BUN 13 mg/dL      Creatinine 0.61 mg/dL      Sodium 137 mmol/L      Potassium 3.8 mmol/L   "    Chloride 103 mmol/L      CO2 24.0 mmol/L      Calcium 9.2 mg/dL      BUN/Creatinine Ratio 21.3     Anion Gap 10.0 mmol/L      eGFR 119.7 mL/min/1.73     Narrative:      GFR Normal >60  Chronic Kidney Disease <60  Kidney Failure <15      D-dimer, Quantitative [[id number]]  (Abnormal) Collected: 05/30/23 1740    Specimen: Blood Updated: 05/30/23 1825     D-Dimer, Quantitative 0.65 MCGFEU/mL     Narrative:      According to the assay 's published package insert, a normal (<0.50 MCGFEU/mL) D-dimer result in conjunction with a non-high clinical probability assessment, excludes deep vein thrombosis (DVT) and pulmonary embolism (PE) with high sensitivity.    D-dimer values increase with age and this can make VTE exclusion of an older population difficult. To address this, the [employer], based on best available evidence and recent guidelines, recommends that clinicians use age-adjusted D-dimer thresholds in patients greater than 50 years of age with: a) a low probability of PE who do not meet all Pulmonary Embolism Rule Out Criteria, or b) in those with intermediate probability of PE.   The formula for an age-adjusted D-dimer cut-off is \"age/100\".  For example, a 60 year old patient would have an age-adjusted cut-off of 0.60 MCGFEU/mL and an 80 year old 0.80 MCGFEU/mL.    Single High Sensitivity Troponin T [[id number]]  (Normal) Collected: 05/30/23 1740    Specimen: Blood Updated: 05/30/23 1832     HS Troponin T <6 ng/L     Narrative:      High Sensitive Troponin T Reference Range:  <10.0 ng/L- Negative Female for AMI  <15.0 ng/L- Negative Male for AMI  >=10 - Abnormal Female indicating possible myocardial injury.  >=15 - Abnormal Male indicating possible myocardial injury.   Clinicians would have to utilize clinical acumen, EKG, Troponin, and serial changes to determine if it is an Acute Myocardial Infarction or myocardial injury due to an underlying chronic condition.         Magnesium " [[id number]]  (Normal) Collected: 05/30/23 1740    Specimen: Blood Updated: 05/30/23 1827     Magnesium 1.8 mg/dL     CBC Auto Differential [[id number]]  (Abnormal) Collected: 05/30/23 1740    Specimen: Blood Updated: 05/30/23 1817     WBC 10.55 10*3/mm3      RBC 4.63 10*6/mm3      Hemoglobin 12.1 g/dL      Hematocrit 38.0 %      MCV 82.1 fL      MCH 26.1 pg      MCHC 31.8 g/dL      RDW 13.7 %      RDW-SD 40.4 fl      MPV 9.2 fL      Platelets 293 10*3/mm3      Neutrophil % 63.9 %      Lymphocyte % 24.4 %      Monocyte % 7.1 %      Eosinophil % 3.9 %      Basophil % 0.4 %      Immature Grans % 0.3 %      Neutrophils, Absolute 6.75 10*3/mm3      Lymphocytes, Absolute 2.57 10*3/mm3      Monocytes, Absolute 0.75 10*3/mm3      Eosinophils, Absolute 0.41 10*3/mm3      Basophils, Absolute 0.04 10*3/mm3      Immature Grans, Absolute 0.03 10*3/mm3      nRBC 0.0 /100 WBC             XR Chest 1 View   Final Result   Pulmonary hypoventilation with central and basilar vascular   congestion, normal heart size.   This report was finalized on 05/30/2023 19:26 by Dr. Manuelito Mcelroy MD.      CT Angiogram Chest   Final Result   1. No evidence of pulmonary embolism, aortic dissection or acute   intrathoracic pathology.               This report was finalized on 05/30/2023 19:33 by Dr. Manuelito Mcelroy MD.          Medications   sodium chloride 0.9 % flush 10 mL (has no administration in time range)   sodium chloride 0.9 % flush 10 mL (has no administration in time range)   sodium chloride 0.9 % bolus 1,000 mL (1,000 mL Intravenous New Bag 5/30/23 1833)   ondansetron (ZOFRAN) injection 4 mg (4 mg Intravenous Given 5/30/23 1833)   iopamidol (ISOVUE-370) 76 % injection 100 mL (100 mL Intravenous Given 5/30/23 1916)       Medical Decision Making  I had a discussion with the patient/family regarding diagnosis, diagnostic results, treatment plan, and medications.  The patient/family indicated understanding of these instructions.  I spent  adequate time at the bedside prior to discharge necessary to discuss the aftercare instructions, giving patient education, providing explanations of the results of our evaluations/findings, and my decision making to assure that the patient/family understand the plan of care.  Time was allotted to answer questions at that time and throughout the ED course.  Emphasis was placed on timely follow-up after discharge.  I also discussed the potential for the development of an acute emergent condition requiring further evaluation, return to the ER, admission, or even surgical intervention. I discussed that we found nothing during the visit today indicating the need for further ER workup at this time, admission to the hospital, or the presence of an acute unstable medical condition.  I encouraged the patient to return to the emergency department immediately for ANY concerns, worsening, new complaints, or if symptoms persist and unable to seek follow-up in a timely fashion.  The patient/family expressed understanding and agreement with this plan.      EMR Dragon/translation disclaimer: Much of this encounter note is an electronic transcription/translation of spoken language to printed text. The electronic translation of spoken language may be erroneous, or at times, nonsensical words or phrases may be inadvertently transcribed. Although I have reviewed the note for such errors, some may still exist.       Problems Addressed:  Chest pain, unspecified type: complicated acute illness or injury  Nausea and vomiting, unspecified vomiting type: complicated acute illness or injury    Amount and/or Complexity of Data Reviewed  Labs: ordered. Decision-making details documented in ED Course.  Radiology: ordered. Decision-making details documented in ED Course.  ECG/medicine tests: ordered. Decision-making details documented in ED Course.    Risk  Prescription drug management.        Final diagnoses:   Chest pain, unspecified type    Nausea and vomiting, unspecified vomiting type       ED Disposition  ED Disposition       ED Disposition   Discharge    Condition   Stable    Comment   --               Eugenio Yang MD  [address] [id number] [phone]    Schedule an appointment as soon as possible for a visit       [facility] Emergency Department  [address] [place] [id number] [phone]    As needed, If symptoms worsen         Medication List        New Prescriptions      ondansetron ODT 4 MG disintegrating tablet  Commonly known as: ZOFRAN-ODT  Place 1 tablet on the tongue Every 8 (Eight) Hours As Needed for Nausea or Vomiting.               Where to Get Your Medications        These medications were sent to [facility] - [place], [place] - [address] [phone] [facility] [phone] [address] [address]      Phone: [phone]   ondansetron ODT 4 MG disintegrating tablet            Doe Andrews MD  05/30/23 1954

## 2023-06-01 LAB
QT INTERVAL: 408 MS
QTC INTERVAL: 433 MS

## 2023-08-02 ENCOUNTER — OFFICE VISIT (OUTPATIENT)
Dept: FAMILY MEDICINE CLINIC | Facility: CLINIC | Age: 38
End: 2023-08-02
Payer: COMMERCIAL

## 2023-08-02 VITALS
OXYGEN SATURATION: 97 % | WEIGHT: 293 LBS | HEART RATE: 91 BPM | BODY MASS INDEX: 44.41 KG/M2 | HEIGHT: 68 IN | RESPIRATION RATE: 20 BRPM | SYSTOLIC BLOOD PRESSURE: 138 MMHG | TEMPERATURE: 97.5 F | DIASTOLIC BLOOD PRESSURE: 78 MMHG

## 2023-08-02 DIAGNOSIS — E66.01 CLASS 3 SEVERE OBESITY DUE TO EXCESS CALORIES WITHOUT SERIOUS COMORBIDITY WITH BODY MASS INDEX (BMI) OF 50.0 TO 59.9 IN ADULT: Primary | ICD-10-CM

## 2023-08-02 RX ORDER — PRAZOSIN HYDROCHLORIDE 2 MG/1
2 CAPSULE ORAL NIGHTLY
Qty: 90 CAPSULE | Refills: 3 | Status: SHIPPED | OUTPATIENT
Start: 2023-08-02

## 2023-08-02 RX ORDER — LAMOTRIGINE 25 MG/1
TABLET ORAL
Qty: 90 TABLET | Refills: 3 | Status: SHIPPED | OUTPATIENT
Start: 2023-08-02

## 2023-11-09 ENCOUNTER — OFFICE VISIT (OUTPATIENT)
Dept: FAMILY MEDICINE CLINIC | Facility: CLINIC | Age: 38
End: 2023-11-09
Payer: COMMERCIAL

## 2023-11-09 VITALS
DIASTOLIC BLOOD PRESSURE: 81 MMHG | HEIGHT: 68 IN | HEART RATE: 80 BPM | OXYGEN SATURATION: 98 % | TEMPERATURE: 98 F | SYSTOLIC BLOOD PRESSURE: 114 MMHG | BODY MASS INDEX: 44.41 KG/M2 | WEIGHT: 293 LBS

## 2023-11-09 DIAGNOSIS — R42 DIZZINESS: Primary | ICD-10-CM

## 2023-11-09 PROCEDURE — 99213 OFFICE O/P EST LOW 20 MIN: CPT | Performed by: NURSE PRACTITIONER

## 2023-11-09 RX ORDER — LIRAGLUTIDE 6 MG/ML
1.2 INJECTION SUBCUTANEOUS 2 TIMES DAILY
COMMUNITY
Start: 2023-10-21

## 2023-11-09 RX ORDER — PRAZOSIN HYDROCHLORIDE 1 MG/1
1 CAPSULE ORAL NIGHTLY
Qty: 30 CAPSULE | Refills: 0 | Status: SHIPPED | OUTPATIENT
Start: 2023-11-09

## 2023-11-09 NOTE — PROGRESS NOTES
"CC: dizziness    History:  Miley Villalba is a 37 y.o. female who presents today for evaluation of the above problems.      Patient complains of dizziness for around two weeks. Is currently on only 3 meds, lamictal, prazosin, and victoza. She has lost 20 pounds on Victoza.   The dizziness is not with position changes. Instead, it occurs when she is up walking around.  Has constant low level dizziness that gets worse the longer she is up.     HPI  ROS:  Review of Systems   Neurological:  Positive for dizziness.       No Known Allergies  Past Medical History:   Diagnosis Date    Other sleep apnea      Past Surgical History:   Procedure Laterality Date    KNEE CARTILAGE SURGERY Right      Family History   Problem Relation Age of Onset    Alzheimer's disease Mother     Diabetes Father     Diabetes Maternal Aunt     Diabetes Maternal Uncle     Diabetes Paternal Aunt     Diabetes Paternal Uncle       reports that she quit smoking about 9 years ago. Her smoking use included cigarettes. She has a 13.00 pack-year smoking history. She has never used smokeless tobacco. She reports current alcohol use. She reports that she does not use drugs.      Current Outpatient Medications:     lamoTRIgine (LaMICtal) 25 MG tablet, 1 at bedtime for mood stabilizer, Disp: 90 tablet, Rfl: 3    prazosin (MINIPRESS) 1 MG capsule, Take 1 capsule by mouth Every Night., Disp: 30 capsule, Rfl: 0    Victoza 18 MG/3ML solution pen-injector injection, Inject 1.2 mg under the skin into the appropriate area as directed 2 (Two) Times a Day., Disp: , Rfl:     OBJECTIVE:  /81 (BP Location: Left arm, Patient Position: Sitting, Cuff Size: Large Adult)   Pulse 80   Temp 98 °F (36.7 °C) (Temporal)   Ht 172.7 cm (68\")   Wt (!) 151 kg (332 lb 9.6 oz)   SpO2 98%   BMI 50.57 kg/m²    Physical Exam  Vitals reviewed.   Constitutional:       Appearance: She is well-developed.   HENT:      Right Ear: Tympanic membrane, ear canal and external ear normal.    "   Left Ear: Tympanic membrane, ear canal and external ear normal.   Cardiovascular:      Rate and Rhythm: Normal rate and regular rhythm.      Heart sounds: Normal heart sounds.   Pulmonary:      Effort: Pulmonary effort is normal.      Breath sounds: Normal breath sounds.   Neurological:      Mental Status: She is alert and oriented to person, place, and time.   Psychiatric:         Behavior: Behavior normal.         Assessment/Plan    Diagnoses and all orders for this visit:    1. Dizziness (Primary)  -     prazosin (MINIPRESS) 1 MG capsule; Take 1 capsule by mouth Every Night.  Dispense: 30 capsule; Refill: 0  -     Comprehensive Metabolic Panel  -     TSH  -     T4, free  -     CBC & Differential    Decrease prazosin dose.      An After Visit Summary was printed and given to the patient at discharge.  Return in about 2 weeks (around 11/23/2023) for Recheck.       ARYA Jacques 11/9/23    Electronically signed.

## 2023-11-11 LAB
ALBUMIN SERPL-MCNC: 4.1 G/DL (ref 3.9–4.9)
ALBUMIN/GLOB SERPL: 1.3 {RATIO} (ref 1.2–2.2)
ALP SERPL-CCNC: 120 IU/L (ref 44–121)
ALT SERPL-CCNC: 18 IU/L (ref 0–32)
AST SERPL-CCNC: 18 IU/L (ref 0–40)
BASOPHILS # BLD AUTO: 0 X10E3/UL (ref 0–0.2)
BASOPHILS NFR BLD AUTO: 0 %
BILIRUB SERPL-MCNC: 0.2 MG/DL (ref 0–1.2)
BUN SERPL-MCNC: 12 MG/DL (ref 6–20)
BUN/CREAT SERPL: 18 (ref 9–23)
CALCIUM SERPL-MCNC: 9.1 MG/DL (ref 8.7–10.2)
CHLORIDE SERPL-SCNC: 102 MMOL/L (ref 96–106)
CO2 SERPL-SCNC: 24 MMOL/L (ref 20–29)
CREAT SERPL-MCNC: 0.66 MG/DL (ref 0.57–1)
EGFRCR SERPLBLD CKD-EPI 2021: 117 ML/MIN/1.73
EOSINOPHIL # BLD AUTO: 0.4 X10E3/UL (ref 0–0.4)
EOSINOPHIL NFR BLD AUTO: 3 %
ERYTHROCYTE [DISTWIDTH] IN BLOOD BY AUTOMATED COUNT: 15 % (ref 11.7–15.4)
GLOBULIN SER CALC-MCNC: 3.1 G/DL (ref 1.5–4.5)
GLUCOSE SERPL-MCNC: 82 MG/DL (ref 70–99)
HCT VFR BLD AUTO: 39 % (ref 34–46.6)
HGB BLD-MCNC: 12.1 G/DL (ref 11.1–15.9)
IMM GRANULOCYTES # BLD AUTO: 0 X10E3/UL (ref 0–0.1)
IMM GRANULOCYTES NFR BLD AUTO: 0 %
LYMPHOCYTES # BLD AUTO: 2.9 X10E3/UL (ref 0.7–3.1)
LYMPHOCYTES NFR BLD AUTO: 29 %
MCH RBC QN AUTO: 26.7 PG (ref 26.6–33)
MCHC RBC AUTO-ENTMCNC: 31 G/DL (ref 31.5–35.7)
MCV RBC AUTO: 86 FL (ref 79–97)
MONOCYTES # BLD AUTO: 0.8 X10E3/UL (ref 0.1–0.9)
MONOCYTES NFR BLD AUTO: 8 %
NEUTROPHILS # BLD AUTO: 6.1 X10E3/UL (ref 1.4–7)
NEUTROPHILS NFR BLD AUTO: 60 %
PLATELET # BLD AUTO: 330 X10E3/UL (ref 150–450)
POTASSIUM SERPL-SCNC: 4 MMOL/L (ref 3.5–5.2)
PROT SERPL-MCNC: 7.2 G/DL (ref 6–8.5)
RBC # BLD AUTO: 4.54 X10E6/UL (ref 3.77–5.28)
SODIUM SERPL-SCNC: 140 MMOL/L (ref 134–144)
T4 FREE SERPL-MCNC: 0.92 NG/DL (ref 0.82–1.77)
TSH SERPL DL<=0.005 MIU/L-ACNC: 1.18 UIU/ML (ref 0.45–4.5)
WBC # BLD AUTO: 10.2 X10E3/UL (ref 3.4–10.8)

## 2023-11-15 PROBLEM — E66.01 MORBID OBESITY: Status: ACTIVE | Noted: 2023-11-15

## 2024-01-15 RX ORDER — CITALOPRAM 40 MG/1
40 TABLET ORAL EVERY MORNING
Qty: 90 TABLET | Refills: 0 | OUTPATIENT
Start: 2024-01-15

## 2024-01-15 NOTE — TELEPHONE ENCOUNTER
The original prescription was discontinued on 11/9/2023 by Kelly Mcelroy MA for the following reason: *Therapy completed. Renewing this prescription may not be appropriate.

## 2024-01-17 RX ORDER — CITALOPRAM 40 MG/1
40 TABLET ORAL EVERY MORNING
Qty: 90 TABLET | Refills: 0 | OUTPATIENT
Start: 2024-01-17

## 2024-01-22 RX ORDER — CITALOPRAM 40 MG/1
40 TABLET ORAL DAILY
Qty: 90 TABLET | Refills: 0 | Status: SHIPPED | OUTPATIENT
Start: 2024-01-22

## 2024-01-22 NOTE — TELEPHONE ENCOUNTER
Pt calling and was to resume taking citalopram 40mg (1) daily--went off for about a month (as suggestion from her wt loss provider wt loss would be slower while taking it) but needs to start taking again.  Do you need to see again or ok to send in refill?    [facility]

## 2024-01-22 NOTE — TELEPHONE ENCOUNTER
Pt says she is doing ok with weight right now--wt check on Sat was 28 lbs lost since she started around Sept.  Would like to stay on 40mg for now since she knows it is working for her and in a few months will discuss lowering dose.

## 2024-03-14 ENCOUNTER — TELEPHONE (OUTPATIENT)
Dept: FAMILY MEDICINE CLINIC | Facility: CLINIC | Age: 39
End: 2024-03-14

## 2024-03-14 RX ORDER — PEN NEEDLE, DIABETIC 31 GX5/16"
NEEDLE, DISPOSABLE MISCELLANEOUS SEE ADMIN INSTRUCTIONS
COMMUNITY
Start: 2024-02-16

## 2024-03-14 NOTE — TELEPHONE ENCOUNTER
Patient advised she can have refills up until the end of May when she is due for her Physical.  Pt voiced understanding

## 2024-03-14 NOTE — TELEPHONE ENCOUNTER
Caller: Miley Villalba    Relationship: Self    Best call back number: [phone]     What is the best time to reach you: ANY    Who are you requesting to speak with (clinical staff, provider,  specific staff member): NONE SPECIFIED     What was the call regarding:     PATIENT IS WONDERING IF SHE IS DUE FOR A MEDICATION FOLLOW-UP? PATIENT STATES HER MEDICATIONS ARE WORKING FINE, HOWEVER SHE DOES NOT WANT TO RUN OUT OF REFILLS.     Is it okay if the provider responds through MyChart: PREFERS PHONE CALL

## 2024-04-10 ENCOUNTER — OFFICE VISIT (OUTPATIENT)
Dept: FAMILY MEDICINE CLINIC | Facility: CLINIC | Age: 39
End: 2024-04-10
Payer: COMMERCIAL

## 2024-04-10 VITALS
TEMPERATURE: 98.4 F | BODY MASS INDEX: 44.41 KG/M2 | HEIGHT: 68 IN | OXYGEN SATURATION: 98 % | DIASTOLIC BLOOD PRESSURE: 88 MMHG | WEIGHT: 293 LBS | HEART RATE: 81 BPM | SYSTOLIC BLOOD PRESSURE: 137 MMHG

## 2024-04-10 DIAGNOSIS — E66.01 MORBID OBESITY: Primary | ICD-10-CM

## 2024-04-10 NOTE — PROGRESS NOTES
Subjective   Miley Villalba is a 38 y.o. female.     History of Present Illness  38-year-old female with morbid obesity and recent weight loss helped by Victoza.  It was stopped and the patient wants to continue weight loss effort      The following portions of the patient's history were reviewed and updated as appropriate: allergies, current medications, past family history, past medical history, past social history, past surgical history, and problem list.    Review of Systems   Respiratory:  Negative for shortness of breath.    Cardiovascular:  Negative for chest pain and leg swelling.   Genitourinary:         Up-to-date on gynecologic exam       Objective   Physical Exam  Vitals and nursing note reviewed.   Constitutional:       Appearance: She is obese.   Eyes:      Extraocular Movements: Extraocular movements intact.      Pupils: Pupils are equal, round, and reactive to light.   Cardiovascular:      Rate and Rhythm: Normal rate and regular rhythm.   Pulmonary:      Effort: Pulmonary effort is normal.      Breath sounds: Normal breath sounds.   Musculoskeletal:      Right lower leg: No edema.      Left lower leg: No edema.   Skin:     General: Skin is warm and dry.   Neurological:      General: No focal deficit present.      Mental Status: She is alert and oriented to person, place, and time.   Psychiatric:         Mood and Affect: Mood normal.         Behavior: Behavior normal.         Thought Content: Thought content normal.         Judgment: Judgment normal.         Assessment & Plan   Diagnoses and all orders for this visit:    1. Morbid obesity (Primary)  -     Ambulatory Referral to Bariatric Surgery       Plan above

## 2024-04-19 RX ORDER — CITALOPRAM 40 MG/1
40 TABLET ORAL DAILY
Qty: 90 TABLET | Refills: 2 | Status: SHIPPED | OUTPATIENT
Start: 2024-04-19

## 2024-04-19 NOTE — TELEPHONE ENCOUNTER
Rx Refill Note  Requested Prescriptions     Pending Prescriptions Disp Refills    citalopram (CeleXA) 40 MG tablet [Pharmacy Med Name: Citalopram Hydrobromide 40 MG Oral Tablet] 90 tablet 0     Sig: Take 1 tablet by mouth once daily      Last office visit with prescribing clinician: 4/10/2024   Last telemedicine visit with prescribing clinician: Visit date not found   Next office visit with prescribing clinician:     Kelly Mcelroy MA  04/19/24, 07:31 CDT

## 2024-05-01 ENCOUNTER — TELEPHONE (OUTPATIENT)
Dept: BARIATRICS/WEIGHT MGMT | Facility: CLINIC | Age: 39
End: 2024-05-01
Payer: COMMERCIAL

## 2024-05-02 ENCOUNTER — OFFICE VISIT (OUTPATIENT)
Dept: BARIATRICS/WEIGHT MGMT | Facility: CLINIC | Age: 39
End: 2024-05-02
Payer: COMMERCIAL

## 2024-05-02 ENCOUNTER — NUTRITION (OUTPATIENT)
Dept: BARIATRICS/WEIGHT MGMT | Facility: CLINIC | Age: 39
End: 2024-05-02
Payer: COMMERCIAL

## 2024-05-02 VITALS
HEIGHT: 66 IN | DIASTOLIC BLOOD PRESSURE: 77 MMHG | OXYGEN SATURATION: 98 % | BODY MASS INDEX: 47.09 KG/M2 | TEMPERATURE: 97.8 F | WEIGHT: 293 LBS | SYSTOLIC BLOOD PRESSURE: 115 MMHG | HEART RATE: 65 BPM

## 2024-05-02 DIAGNOSIS — E66.01 CLASS 3 SEVERE OBESITY DUE TO EXCESS CALORIES WITH SERIOUS COMORBIDITY AND BODY MASS INDEX (BMI) OF 50.0 TO 59.9 IN ADULT: Primary | ICD-10-CM

## 2024-05-02 DIAGNOSIS — E66.01 MORBID OBESITY: Primary | ICD-10-CM

## 2024-05-02 DIAGNOSIS — G47.39 OTHER SLEEP APNEA: ICD-10-CM

## 2024-05-02 PROBLEM — E66.813 CLASS 3 SEVERE OBESITY DUE TO EXCESS CALORIES WITH SERIOUS COMORBIDITY AND BODY MASS INDEX (BMI) OF 50.0 TO 59.9 IN ADULT: Status: ACTIVE | Noted: 2023-11-15

## 2024-05-02 NOTE — PROGRESS NOTES
"Metabolic and Bariatric Surgery Adult Nutrition Assessment    Patient Name: Miley Villalba   YOB: 1985   MRN: [id number]     Assessment Date:  05/02/2024     Reason for Visit: Initial New Patient Nutrition Education Class    Treatment Pathway: Medical Weight Loss    Assessment    Anthropometrics   Wt Readings from Last 1 Encounters:   05/02/24 (!) 146 kg (321 lb 3.2 oz)     Ht Readings from Last 1 Encounters:   05/02/24 167.6 cm (66\")     BMI Readings from Last 1 Encounters:   05/02/24 51.84 kg/m²        Initial Weight/Date: 321.2 lbs (May 2024)    Past Medical History:   Diagnosis Date    Other sleep apnea       Past Surgical History:   Procedure Laterality Date    KNEE CARTILAGE SURGERY Right       Current Outpatient Medications   Medication Sig Dispense Refill    citalopram (CeleXA) 40 MG tablet Take 1 tablet by mouth once daily 90 tablet 2    lamoTRIgine (LaMICtal) 25 MG tablet 1 at bedtime for mood stabilizer 90 tablet 3    prazosin (MINIPRESS) 1 MG capsule Take 1 capsule by mouth Every Night. 30 capsule 0     No current facility-administered medications for this visit.      No Known Allergies       Nutrition Intervention  Nutrition education and nutrition coaching for behavior change provided.  Strategies used included Comprehensive education & skill development for measuring portions, reading food labels, calculating protein, meal planning, understanding appropriate drink choices, and evaluating condiments, seasonings, and cooking methods.   Review of medical weight loss prescription plan and reviewed nutritional needs for Medical Weight Loss.  Self-monitoring strategies such as keeping a food journal (on paper or electronically) were discussed.  Recommended increasing physical activity, beyond normal daily habits, gradually working to reach ~30 minutes daily.     Recommended Diet Changes- Green Prescription Meal Plan. Provided Sample Menus  Eat 4 meals per day with protein and vegetables at " each meal, no carbs after meal 2., Protein goal: 65 gms., Eat vegetables first at each meal., Discussed protein guidelines for shakes and bars., Reduce snacking -use foods from free foods list only., Reduce fat, sugar, and/or salt in food choices., Choose more nutrient dense foods., Choose foods with increased fiber., Monitor portion sizes using a food scale and/or measuring cup., Eliminate soda and sugar-sweetened beverages, and Increase fluid intake to 64 ounces per day       Monitoring/Evaluation Plan  Anticipate follow up in one month. Continue collaboration of care with physician and treatment team.     Electronically signed by  Priyanka Chowdhury RDN, LD  05/02/2024 11:11 CDT.

## 2024-05-02 NOTE — ASSESSMENT & PLAN NOTE
Patient's (Body mass index is 51.84 kg/m².) indicates that they are morbidly/severely obese (BMI > 40 or > 35 with obesity - related health condition) with health conditions that include obstructive sleep apnea . Weight is improving with treatment. BMI  is above average; BMI management plan is completed. We discussed portion control and increasing exercise.

## 2024-05-02 NOTE — PROGRESS NOTES
Patient Care Team:  Eugenio Yang MD as PCP - General (Family Medicine)      Subjective     Patient is a 38 y.o. female presents with morbid obesity and her Body mass index is 51.84 kg/m².     Patient was referred to our practice by PCP.  She is here for discussion of medical weight loss options. She stated she has been with the disease of obesity for about 20 years. She stated she suffers from REE and morbid obesity due to her weight gain.  She stated that weight loss helps alleviate these symptoms.   She has tried KETO, Low carb, and phentermine. She was recently on Victoza and states it worked well on her.  She stated that she has attempted these conservative methods for weight loss without maintaining long term success.      She states the weight worsened around teenage years and the highest weight was 354 lbs.    Review of Systems   Constitutional:  Positive for fatigue.   Respiratory: Negative.     Cardiovascular: Negative.    Gastrointestinal: Negative.    Endocrine: Negative.    Musculoskeletal: Negative.         History  Past Medical History:   Diagnosis Date    Other sleep apnea       Past Surgical History:   Procedure Laterality Date    KNEE CARTILAGE SURGERY Right       Social History     Socioeconomic History    Marital status:    Tobacco Use    Smoking status: Former     Current packs/day: 0.00     Average packs/day: 1 pack/day for 13.0 years (13.0 ttl pk-yrs)     Types: Cigarettes     Start date: 2001     Quit date: 2014     Years since quitting: 10.3     Passive exposure: Past    Smokeless tobacco: Never   Vaping Use    Vaping status: Never Used   Substance and Sexual Activity    Alcohol use: Yes     Comment: couple drinks a year    Drug use: Never    Sexual activity: Defer     Partners: Female, Male      Family History   Problem Relation Age of Onset    Alzheimer's disease Mother     Diabetes Father     Diabetes Maternal Aunt     Diabetes Maternal Uncle     Diabetes Paternal Aunt      Diabetes Paternal Uncle       No Known Allergies       Current Outpatient Medications:     citalopram (CeleXA) 40 MG tablet, Take 1 tablet by mouth once daily, Disp: 90 tablet, Rfl: 2    lamoTRIgine (LaMICtal) 25 MG tablet, 1 at bedtime for mood stabilizer, Disp: 90 tablet, Rfl: 3    prazosin (MINIPRESS) 1 MG capsule, Take 1 capsule by mouth Every Night., Disp: 30 capsule, Rfl: 0    Objective     Vital Signs  Temp:  [97.8 °F (36.6 °C)] 97.8 °F (36.6 °C)  Heart Rate:  [65] 65  BP: (115)/(77) 115/77  Body mass index is 51.84 kg/m².      05/02/24  1014   Weight: (!) 146 kg (321 lb 3.2 oz)     Waist Circumference: 61  Hip Circunference: 51      Physical Exam  Vitals reviewed.   Constitutional:       Appearance: She is obese.   Cardiovascular:      Rate and Rhythm: Normal rate and regular rhythm.   Pulmonary:      Effort: Pulmonary effort is normal.   Abdominal:      General: Bowel sounds are normal.      Palpations: Abdomen is soft.   Musculoskeletal:         General: Normal range of motion.   Skin:     General: Skin is warm and dry.   Neurological:      Mental Status: She is alert and oriented to person, place, and time.   Psychiatric:         Mood and Affect: Mood normal.         Behavior: Behavior normal.            Results Review:   I reviewed the patient's new clinical results.      Assessment & Plan   Diagnoses and all orders for this visit:    1. Class 3 severe obesity due to excess calories with serious comorbidity and body mass index (BMI) of 50.0 to 59.9 in adult (Primary)  Assessment & Plan:  Patient's (Body mass index is 51.84 kg/m².) indicates that they are morbidly/severely obese (BMI > 40 or > 35 with obesity - related health condition) with health conditions that include obstructive sleep apnea . Weight is improving with treatment. BMI  is above average; BMI management plan is completed. We discussed portion control and increasing exercise.       2. Other sleep apnea        I discussed the patient's  findings and my recommendations with patient.         She has been provided a structured dietary regimen based off of her behavior.  I discussed with the patient the etiology of the disease of obesity and the potential comorbid conditions associated with this disease.  She was instructed to follow the dietary regimen and follow-up with our program in 1 month's time with any additional questions as they may arise during this time.  We emphasized on focusing on adequate protein and meals high in fiber as well as adequate hydration that exceed 64 ounces of water daily.    A total of 30 minutes was spent face to face with this patient and over half of the time was spent on counseling and coordination of care for the disease of obesity. We specifically reviewed the dietary prescription and I made recommendations toward increasing exercise as tolerated as well as focusing on training their behavior toward storing less.    I explained that I anticipate the patient to lose weight prior to her next monthly visit.  I encouraged patient to have a reward day once a month.  Patient will begin GREEN meal plan.      Patient will begin in our [facility] program by choice.     ARYA Kaye   15:02 CDT  05/02/24

## 2024-05-03 ENCOUNTER — PATIENT ROUNDING (BHMG ONLY) (OUTPATIENT)
Dept: BARIATRICS/WEIGHT MGMT | Facility: CLINIC | Age: 39
End: 2024-05-03
Payer: COMMERCIAL

## 2024-05-03 NOTE — PROGRESS NOTES
May 3, 2024    Hello, may I speak with Miley Villalba?    My name is Christen Peñaloza    I am  with [facility]  [address], [address]  [place] [address] [phone].    Before we get started may I verify your date of birth? 1985    I am calling to officially welcome you to our practice and ask about your recent visit. Is this a good time to talk? yes    Tell me about your visit with us. What things went well?  Good visit       We're always looking for ways to make our patients' experiences even better. Do you have recommendations on ways we may improve?  no    Overall were you satisfied with your first visit to our practice? yes       I appreciate you taking the time to speak with me today. Is there anything else I can do for you? no      Thank you, and have a great day.

## 2024-05-30 ENCOUNTER — OFFICE VISIT (OUTPATIENT)
Dept: FAMILY MEDICINE CLINIC | Facility: CLINIC | Age: 39
End: 2024-05-30
Payer: COMMERCIAL

## 2024-05-30 VITALS
DIASTOLIC BLOOD PRESSURE: 78 MMHG | HEART RATE: 75 BPM | TEMPERATURE: 98 F | HEIGHT: 68 IN | SYSTOLIC BLOOD PRESSURE: 113 MMHG | OXYGEN SATURATION: 98 % | WEIGHT: 293 LBS | BODY MASS INDEX: 44.41 KG/M2

## 2024-05-30 DIAGNOSIS — R00.2 PALPITATIONS: Primary | ICD-10-CM

## 2024-05-30 PROCEDURE — 1126F AMNT PAIN NOTED NONE PRSNT: CPT | Performed by: NURSE PRACTITIONER

## 2024-05-30 PROCEDURE — 1160F RVW MEDS BY RX/DR IN RCRD: CPT | Performed by: NURSE PRACTITIONER

## 2024-05-30 PROCEDURE — 99214 OFFICE O/P EST MOD 30 MIN: CPT | Performed by: NURSE PRACTITIONER

## 2024-05-30 PROCEDURE — 93000 ELECTROCARDIOGRAM COMPLETE: CPT | Performed by: NURSE PRACTITIONER

## 2024-05-30 PROCEDURE — 1159F MED LIST DOCD IN RCRD: CPT | Performed by: NURSE PRACTITIONER

## 2024-05-30 RX ORDER — OMEPRAZOLE 40 MG/1
40 CAPSULE, DELAYED RELEASE ORAL DAILY
Qty: 30 CAPSULE | Refills: 0 | Status: SHIPPED | OUTPATIENT
Start: 2024-05-30

## 2024-05-30 NOTE — PROGRESS NOTES
CC: chest fluttering    History:  Miley Villalba is a 38 y.o. female who presents today for evaluation of the above problems.      Patient has been experiencing chest flutter and at times sharp pains for the past several weeks. Notes that she has lost a significant amount of weight and her diet has changed considerably. States that she generally eats protein and vegetables, and very limited carbohydrate intake. Has never had known heartburn/acid reflux. States that she typically has symptoms if she lays on her left side at night, and occasionally when driving. If she is laying down, sitting up for a few minutes will relieve symptoms.       HPI  ROS:  Review of Systems   Respiratory:  Negative for shortness of breath.    Cardiovascular:  Positive for chest pain and palpitations.       No Known Allergies  Past Medical History:   Diagnosis Date    Other sleep apnea      Past Surgical History:   Procedure Laterality Date    KNEE CARTILAGE SURGERY Right      Family History   Problem Relation Age of Onset    Alzheimer's disease Mother     Diabetes Father     Diabetes Maternal Aunt     Diabetes Maternal Uncle     Diabetes Paternal Aunt     Diabetes Paternal Uncle       reports that she quit smoking about 10 years ago. Her smoking use included cigarettes. She started smoking about 23 years ago. She has a 13 pack-year smoking history. She has been exposed to tobacco smoke. She has never used smokeless tobacco. She reports current alcohol use. She reports that she does not use drugs.      Current Outpatient Medications:     citalopram (CeleXA) 40 MG tablet, Take 1 tablet by mouth once daily, Disp: 90 tablet, Rfl: 2    lamoTRIgine (LaMICtal) 25 MG tablet, 1 at bedtime for mood stabilizer, Disp: 90 tablet, Rfl: 3    prazosin (MINIPRESS) 1 MG capsule, Take 1 capsule by mouth Every Night., Disp: 30 capsule, Rfl: 0    omeprazole (priLOSEC) 40 MG capsule, Take 1 capsule by mouth Daily., Disp: 30 capsule, Rfl: 0    OBJECTIVE:  BP  "113/78 (BP Location: Left arm, Patient Position: Sitting, Cuff Size: Large Adult)   Pulse 75   Temp 98 °F (36.7 °C) (Temporal)   Ht 172.7 cm (68\")   Wt (!) 138 kg (304 lb)   SpO2 98%   BMI 46.22 kg/m²    Physical Exam  Vitals reviewed.   Constitutional:       Appearance: She is well-developed.   Cardiovascular:      Rate and Rhythm: Normal rate and regular rhythm.      Heart sounds: Normal heart sounds.   Pulmonary:      Effort: Pulmonary effort is normal.      Breath sounds: Normal breath sounds.   Abdominal:      General: Abdomen is flat. There is no distension.      Palpations: Abdomen is soft.      Tenderness: There is no abdominal tenderness.   Neurological:      Mental Status: She is alert and oriented to person, place, and time.   Psychiatric:         Behavior: Behavior normal.         Assessment/Plan    Diagnoses and all orders for this visit:    1. Palpitations (Primary)  -     ECG 12 Lead  -     Holter Monitor - 72 Hour Up To 15 Days; Future  -     omeprazole (priLOSEC) 40 MG capsule; Take 1 capsule by mouth Daily.  Dispense: 30 capsule; Refill: 0          An After Visit Summary was printed and given to the patient at discharge.  Return if symptoms worsen or fail to improve, for Next scheduled follow up.       ARYA Jacques 5/30/24    Electronically signed.  "

## 2024-05-30 NOTE — PROGRESS NOTES
Procedure     ECG 12 Lead    Date/Time: 5/30/2024 8:45 AM  Performed by: Shivani Miller APRN    Authorized by: Shivani Miller APRN  Comparison: compared with previous ECG from 6/1/2023  Similar to previous ECG  Rhythm: sinus rhythm  Rate: normal  BPM: 63  Conduction: conduction normal  ST Segments: ST segments normal  T Waves: T waves normal  QRS axis: normal  Other: no other findings    Clinical impression: normal ECG

## 2024-06-05 DIAGNOSIS — R42 DIZZINESS: ICD-10-CM

## 2024-06-05 RX ORDER — PRAZOSIN HYDROCHLORIDE 1 MG/1
1 CAPSULE ORAL NIGHTLY
Qty: 90 CAPSULE | Refills: 0 | Status: SHIPPED | OUTPATIENT
Start: 2024-06-05

## 2024-06-05 NOTE — TELEPHONE ENCOUNTER
Rx Refill Note  Requested Prescriptions     Pending Prescriptions Disp Refills    prazosin (MINIPRESS) 1 MG capsule [Pharmacy Med Name: Prazosin HCl 1 MG Oral Capsule] 90 capsule 0     Sig: TAKE 1 CAPSULE BY MOUTH ONCE DAILY AT NIGHT      Last office visit with prescribing clinician: 4/10/2024   Last telemedicine visit with prescribing clinician: Visit date not found   Next office visit with prescribing clinician: 6/19/2024       Sue Ann MA  06/05/24, 07:14 CDT

## 2024-06-14 ENCOUNTER — LAB (OUTPATIENT)
Dept: LAB | Facility: HOSPITAL | Age: 39
End: 2024-06-14
Payer: COMMERCIAL

## 2024-06-14 ENCOUNTER — OFFICE VISIT (OUTPATIENT)
Dept: BARIATRICS/WEIGHT MGMT | Facility: CLINIC | Age: 39
End: 2024-06-14
Payer: COMMERCIAL

## 2024-06-14 ENCOUNTER — NUTRITION (OUTPATIENT)
Dept: BARIATRICS/WEIGHT MGMT | Facility: CLINIC | Age: 39
End: 2024-06-14
Payer: COMMERCIAL

## 2024-06-14 VITALS
OXYGEN SATURATION: 98 % | HEART RATE: 85 BPM | SYSTOLIC BLOOD PRESSURE: 127 MMHG | HEIGHT: 66 IN | BODY MASS INDEX: 47.09 KG/M2 | WEIGHT: 293 LBS | TEMPERATURE: 98 F | DIASTOLIC BLOOD PRESSURE: 83 MMHG

## 2024-06-14 DIAGNOSIS — R53.83 OTHER FATIGUE: ICD-10-CM

## 2024-06-14 DIAGNOSIS — Z13.21 SCREENING FOR MALNUTRITION: ICD-10-CM

## 2024-06-14 DIAGNOSIS — E66.01 CLASS 3 SEVERE OBESITY DUE TO EXCESS CALORIES WITH SERIOUS COMORBIDITY AND BODY MASS INDEX (BMI) OF 45.0 TO 49.9 IN ADULT: Primary | ICD-10-CM

## 2024-06-14 DIAGNOSIS — E55.9 VITAMIN D DEFICIENCY: ICD-10-CM

## 2024-06-14 DIAGNOSIS — E66.01 CLASS 3 SEVERE OBESITY DUE TO EXCESS CALORIES WITH SERIOUS COMORBIDITY AND BODY MASS INDEX (BMI) OF 45.0 TO 49.9 IN ADULT: ICD-10-CM

## 2024-06-14 DIAGNOSIS — G47.39 OTHER SLEEP APNEA: ICD-10-CM

## 2024-06-14 LAB
25(OH)D3 SERPL-MCNC: 32.3 NG/ML (ref 30–100)
ALBUMIN SERPL-MCNC: 4.3 G/DL (ref 3.5–5.2)
ALBUMIN/GLOB SERPL: 1.2 G/DL
ALP SERPL-CCNC: 119 U/L (ref 39–117)
ALT SERPL W P-5'-P-CCNC: 20 U/L (ref 1–33)
ANION GAP SERPL CALCULATED.3IONS-SCNC: 9 MMOL/L (ref 5–15)
AST SERPL-CCNC: 22 U/L (ref 1–32)
BASOPHILS # BLD AUTO: 0.04 10*3/MM3 (ref 0–0.2)
BASOPHILS NFR BLD AUTO: 0.3 % (ref 0–1.5)
BILIRUB SERPL-MCNC: 0.3 MG/DL (ref 0–1.2)
BUN SERPL-MCNC: 9 MG/DL (ref 6–20)
BUN/CREAT SERPL: 17.3 (ref 7–25)
CALCIUM SPEC-SCNC: 9.2 MG/DL (ref 8.6–10.5)
CHLORIDE SERPL-SCNC: 101 MMOL/L (ref 98–107)
CO2 SERPL-SCNC: 29 MMOL/L (ref 22–29)
CREAT SERPL-MCNC: 0.52 MG/DL (ref 0.57–1)
DEPRECATED RDW RBC AUTO: 40.7 FL (ref 37–54)
EGFRCR SERPLBLD CKD-EPI 2021: 123.7 ML/MIN/1.73
EOSINOPHIL # BLD AUTO: 0.47 10*3/MM3 (ref 0–0.4)
EOSINOPHIL NFR BLD AUTO: 4 % (ref 0.3–6.2)
ERYTHROCYTE [DISTWIDTH] IN BLOOD BY AUTOMATED COUNT: 14 % (ref 12.3–15.4)
FOLATE SERPL-MCNC: 10 NG/ML (ref 4.78–24.2)
GLOBULIN UR ELPH-MCNC: 3.5 GM/DL
GLUCOSE SERPL-MCNC: 89 MG/DL (ref 65–99)
HCT VFR BLD AUTO: 37.5 % (ref 34–46.6)
HGB BLD-MCNC: 12.2 G/DL (ref 12–15.9)
IMM GRANULOCYTES # BLD AUTO: 0.04 10*3/MM3 (ref 0–0.05)
IMM GRANULOCYTES NFR BLD AUTO: 0.3 % (ref 0–0.5)
IRON 24H UR-MRATE: 26 MCG/DL (ref 37–145)
LYMPHOCYTES # BLD AUTO: 2.28 10*3/MM3 (ref 0.7–3.1)
LYMPHOCYTES NFR BLD AUTO: 19.6 % (ref 19.6–45.3)
MCH RBC QN AUTO: 26.4 PG (ref 26.6–33)
MCHC RBC AUTO-ENTMCNC: 32.5 G/DL (ref 31.5–35.7)
MCV RBC AUTO: 81.2 FL (ref 79–97)
MONOCYTES # BLD AUTO: 0.75 10*3/MM3 (ref 0.1–0.9)
MONOCYTES NFR BLD AUTO: 6.4 % (ref 5–12)
NEUTROPHILS NFR BLD AUTO: 69.4 % (ref 42.7–76)
NEUTROPHILS NFR BLD AUTO: 8.06 10*3/MM3 (ref 1.7–7)
NRBC BLD AUTO-RTO: 0 /100 WBC (ref 0–0.2)
PLATELET # BLD AUTO: 297 10*3/MM3 (ref 140–450)
PMV BLD AUTO: 9.8 FL (ref 6–12)
POTASSIUM SERPL-SCNC: 3.9 MMOL/L (ref 3.5–5.2)
PROT SERPL-MCNC: 7.8 G/DL (ref 6–8.5)
RBC # BLD AUTO: 4.62 10*6/MM3 (ref 3.77–5.28)
SODIUM SERPL-SCNC: 139 MMOL/L (ref 136–145)
VIT B12 BLD-MCNC: 473 PG/ML (ref 211–946)
WBC NRBC COR # BLD AUTO: 11.64 10*3/MM3 (ref 3.4–10.8)

## 2024-06-14 PROCEDURE — 85025 COMPLETE CBC W/AUTO DIFF WBC: CPT

## 2024-06-14 PROCEDURE — 83540 ASSAY OF IRON: CPT

## 2024-06-14 PROCEDURE — 84590 ASSAY OF VITAMIN A: CPT

## 2024-06-14 PROCEDURE — 84630 ASSAY OF ZINC: CPT

## 2024-06-14 PROCEDURE — 80053 COMPREHEN METABOLIC PANEL: CPT

## 2024-06-14 PROCEDURE — 82746 ASSAY OF FOLIC ACID SERUM: CPT

## 2024-06-14 PROCEDURE — 84425 ASSAY OF VITAMIN B-1: CPT

## 2024-06-14 PROCEDURE — 82607 VITAMIN B-12: CPT

## 2024-06-14 PROCEDURE — 84207 ASSAY OF VITAMIN B-6: CPT

## 2024-06-14 PROCEDURE — 84446 ASSAY OF VITAMIN E: CPT

## 2024-06-14 PROCEDURE — 36415 COLL VENOUS BLD VENIPUNCTURE: CPT

## 2024-06-14 PROCEDURE — 82306 VITAMIN D 25 HYDROXY: CPT

## 2024-06-14 RX ORDER — NALOXONE HYDROCHLORIDE 4 MG/.1ML
1 SPRAY NASAL AS NEEDED
COMMUNITY
Start: 2024-06-09

## 2024-06-14 NOTE — ASSESSMENT & PLAN NOTE
Patient's (Body mass index is 48.81 kg/m².) indicates that they are morbidly/severely obese (BMI > 40 or > 35 with obesity - related health condition) with health conditions that include obstructive sleep apnea . Weight is improving with treatment. BMI  is above average; BMI management plan is completed. We discussed portion control and increasing exercise.

## 2024-06-14 NOTE — PROGRESS NOTES
"Patient Care Team:  Eugenio Yang MD as PCP - General (Family Medicine)    Reason for Visit:  MedicalWeight loss    Subjective   Miley Villalba is a 38 y.o. female.     Miley is here for follow-up and continued medical management of her morbid obesity.  She is currently on a prescription diet.  Miley previously was to apply dietary changes such as following the meal plan as directed.  Patient admits to eating around 4 meals per day.  She  admits to drinking at least 64 ounces or more of fluid each day and intaking 40-60 grams of protein..  She  is currently exercising yard work several days..  She  states that she has gone without soda intake and denies  nicotine use.  Patient has lost 19  pounds since her last appointment with us.     Review Of Systems:  Review of Systems   Constitutional: Negative.    Respiratory: Negative.     Cardiovascular: Negative.    Gastrointestinal: Negative.    Endocrine: Negative.    Musculoskeletal: Negative.    Psychiatric/Behavioral: Negative.           The following portions of the patient's history were reviewed and updated as appropriate: allergies, current medications, past family history, past medical history, past social history, past surgical history, and problem list.    Objective   /83 (BP Location: Right arm, Patient Position: Sitting, Cuff Size: Adult)   Pulse 85   Temp 98 °F (36.7 °C)   Ht 167.6 cm (66\")   Wt (!) 137 kg (302 lb 6.4 oz)   SpO2 98%   BMI 48.81 kg/m²       06/14/24  0913   Weight: (!) 137 kg (302 lb 6.4 oz)            Physical Exam  Vitals reviewed.   Constitutional:       Appearance: She is obese.   Cardiovascular:      Rate and Rhythm: Normal rate and regular rhythm.   Pulmonary:      Effort: Pulmonary effort is normal.   Abdominal:      General: Bowel sounds are normal.      Palpations: Abdomen is soft.   Musculoskeletal:         General: Normal range of motion.   Skin:     General: Skin is warm and dry.   Neurological:      Mental " Status: She is alert and oriented to person, place, and time.   Psychiatric:         Mood and Affect: Mood normal.         Behavior: Behavior normal.         Assessment & Plan   Diagnoses and all orders for this visit:    1. Class 3 severe obesity due to excess calories with serious comorbidity and body mass index (BMI) of 45.0 to 49.9 in adult (Primary)  Assessment & Plan:  Patient's (Body mass index is 48.81 kg/m².) indicates that they are morbidly/severely obese (BMI > 40 or > 35 with obesity - related health condition) with health conditions that include obstructive sleep apnea . Weight is improving with treatment. BMI  is above average; BMI management plan is completed. We discussed portion control and increasing exercise.     Orders:  -     CBC & Differential; Future  -     Comprehensive Metabolic Panel; Future  -     Zinc; Future  -     Folate; Future  -     Vitamin A & E; Future  -     Vitamin B12; Future  -     Vitamin D,25-Hydroxy; Future  -     Vitamin B6; Future  -     Iron; Future  -     Vitamin B1, Whole Blood; Future    2. Other sleep apnea    3. Screening for malnutrition  -     CBC & Differential; Future  -     Comprehensive Metabolic Panel; Future  -     Zinc; Future  -     Folate; Future  -     Vitamin A & E; Future  -     Vitamin B12; Future  -     Vitamin D,25-Hydroxy; Future  -     Vitamin B6; Future  -     Iron; Future  -     Vitamin B1, Whole Blood; Future    4. Other fatigue  -     CBC & Differential; Future  -     Comprehensive Metabolic Panel; Future  -     Zinc; Future  -     Folate; Future  -     Vitamin A & E; Future  -     Vitamin B12; Future  -     Vitamin D,25-Hydroxy; Future  -     Vitamin B6; Future  -     Iron; Future  -     Vitamin B1, Whole Blood; Future    5. Vitamin D deficiency  -     CBC & Differential; Future  -     Comprehensive Metabolic Panel; Future  -     Zinc; Future  -     Folate; Future  -     Vitamin A & E; Future  -     Vitamin B12; Future  -     Vitamin  D,25-Hydroxy; Future  -     Vitamin B6; Future  -     Iron; Future  -     Vitamin B1, Whole Blood; Future         Miley Villalba was seen today for follow-up, obesity, nutrition counseling and weight loss.    Today we discussed healthy changes in lifestyle, diet, and exercise. Dietician consultation obtained.  Miley Villalba had received handouts to her explaining the recommendation on portion sizes/appetite control/reading nutrition labels.   Intensive behavioral therapy for obesity was done today as well.     Goals for this month are:   Focus more on the weekend and staying consistent   Increase exercise- goal is to work towards 150 minutes of cardio each day   Vitamin panel ordered today     Follow up in 1 month for a weight recheck.    Patient will continue GREEN meal plan.

## 2024-06-14 NOTE — PROGRESS NOTES
"Metabolic and Bariatric Surgery Adult Nutrition Assessment    Patient Name: Miley Villlaba   YOB: 1985   MRN: [id number]     Assessment Date:  06/14/2024     Reason for Visit: Follow-up Nutrition Assessment     Treatment Pathway: Medical Weight Loss    Assessment    Anthropometrics   Wt Readings from Last 1 Encounters:   06/14/24 (!) 137 kg (302 lb 6.4 oz)     Ht Readings from Last 1 Encounters:   06/14/24 167.6 cm (66\")     BMI Readings from Last 1 Encounters:   06/14/24 48.81 kg/m²        Initial Weight/Date: 321.2 lbs (May 2024)  Weight Changes since last visit: -18.8 lbs  Net Weight Change: -18.8 lbs    Past Medical History:   Diagnosis Date    Other sleep apnea       Past Surgical History:   Procedure Laterality Date    KNEE CARTILAGE SURGERY Right       Current Outpatient Medications   Medication Sig Dispense Refill    citalopram (CeleXA) 40 MG tablet Take 1 tablet by mouth once daily 90 tablet 2    lamoTRIgine (LaMICtal) 25 MG tablet 1 at bedtime for mood stabilizer 90 tablet 3    naloxone (NARCAN) 4 MG/0.1ML nasal spray 1 spray into the nostril(s) as directed by provider As Needed.      omeprazole (priLOSEC) 40 MG capsule Take 1 capsule by mouth Daily. 30 capsule 0    prazosin (MINIPRESS) 1 MG capsule TAKE 1 CAPSULE BY MOUTH ONCE DAILY AT NIGHT 90 capsule 0     No current facility-administered medications for this visit.      No Known Allergies         Nutrition Recall  Eating 2-4 meals daily   Food journal reviewed.   Snacking - None  Monitoring portions- yes, using measuring cups and scale.   Calculating Protein- avg 70 grams.   Drinking sugary/carbonated beverages- none  Fluid Intake- at least 64+ ounces. Weekends usually ~ 80 oz.       Success this Month: can walk and breathe at the same time; has more energy throughout the day. Food is good. Finding ways to cook food and enjoying it. Prepping ahead of time for meals about 2 days at a time (instead of eating out daily). Broke habit of " snacking this month. Has been packing meals to eat them all the ballpark instead of eating out.   Barriers: struggles finding a substitute for condiments specifically ketchup. Not hungry so skipping meals sometimes- usually meals 3 and or 4. Weekends are a struggle- not making time to eat on the weekends.   Not as happy the last couple weeks, admits to being more angry- she has considered if it's related to not getting dopamine response from foods since she's eliminated almost all fast food. Sees a psychologist every other week and sees PCP next week; considering requesting changing meds. We also discussed other healthy coping mechanisms to increase dopamine.     Exercise: yard work several days weekly ~ 1hr. Finding gaps during day to get up and walk.     Nutrition Intervention  Nutrition education for morbid obesity. Nutrition coaching and intensive behavioral therapy for behavior change provided.  Strategies used included Comprehensive education, Motivational Interviewing , Problem Solving, Skill Development for meal planning, and Ongoing reinforcement  Review of medical weight loss prescription 4 meal/day plan and reviewed nutritional needs for Medical Weight Loss.  Self-monitoring strategies such as keeping a food journal (on paper or electronically) and calculating fluid/protein intake were discussed.  Recommend increasing physical activity, beyond normal daily habits, gradually working to reach ~30 minutes daily.     Recommended Diet Changes- Green Prescription Meal Plan  Eat 4 meals per day with protein and vegetables at each meal, no carbs after meal 2., Protein goal: 65 gms., Eat vegetables first at each meal., Discussed protein guidelines for shakes and bars., Reduce snacking -use foods from free foods list only., Reduce fat, sugar, and/or salt in food choices., Choose more nutrient dense foods., Choose foods with increased fiber., Monitor portion sizes using a food scale and/or measuring cup., Eliminate  soda and sugar-sweetened beverages, and Increase fluid intake to 64 ounces per day      Goals  1. Increase exercise/activity.   2. Continue to work on having 4 meals/day.  3. Consistency on the weekends.     Monitoring/Evaluation Plan  Anticipate follow in 1 months. Continue collaboration of care with physician and treatment team.     Time Spent 20 minutes.     Electronically signed by  Priyanka Chowdhury RDN, LD  06/14/2024 09:29 CDT.

## 2024-06-18 LAB
PYRIDOXAL PHOS SERPL-MCNC: 5.5 UG/L (ref 3.4–65.2)
VIT B1 BLD-SCNC: 129.1 NMOL/L (ref 66.5–200)
ZINC SERPL-MCNC: 57 UG/DL (ref 44–115)

## 2024-06-19 ENCOUNTER — PATIENT ROUNDING (BHMG ONLY) (OUTPATIENT)
Dept: FAMILY MEDICINE CLINIC | Facility: CLINIC | Age: 39
End: 2024-06-19

## 2024-06-19 ENCOUNTER — OFFICE VISIT (OUTPATIENT)
Dept: FAMILY MEDICINE CLINIC | Facility: CLINIC | Age: 39
End: 2024-06-19
Payer: COMMERCIAL

## 2024-06-19 VITALS
OXYGEN SATURATION: 98 % | DIASTOLIC BLOOD PRESSURE: 78 MMHG | HEART RATE: 83 BPM | HEIGHT: 66 IN | SYSTOLIC BLOOD PRESSURE: 128 MMHG | RESPIRATION RATE: 18 BRPM | TEMPERATURE: 97 F | WEIGHT: 293 LBS | BODY MASS INDEX: 47.09 KG/M2

## 2024-06-19 DIAGNOSIS — I15.9 SECONDARY HYPERTENSION: ICD-10-CM

## 2024-06-19 DIAGNOSIS — Z00.00 ROUTINE GENERAL MEDICAL EXAMINATION AT A HEALTH CARE FACILITY: Primary | ICD-10-CM

## 2024-06-19 LAB
BILIRUB BLD-MCNC: NEGATIVE MG/DL
CLARITY, POC: CLEAR
COLOR UR: YELLOW
GLUCOSE UR STRIP-MCNC: NEGATIVE MG/DL
KETONES UR QL: NEGATIVE
LEUKOCYTE EST, POC: NEGATIVE
NITRITE UR-MCNC: NEGATIVE MG/ML
PH UR: 7 [PH] (ref 5–8)
PROT UR STRIP-MCNC: NEGATIVE MG/DL
RBC # UR STRIP: ABNORMAL /UL
SP GR UR: 1.02 (ref 1–1.03)
UROBILINOGEN UR QL: NORMAL

## 2024-06-19 PROCEDURE — 1159F MED LIST DOCD IN RCRD: CPT | Performed by: FAMILY MEDICINE

## 2024-06-19 PROCEDURE — 1160F RVW MEDS BY RX/DR IN RCRD: CPT | Performed by: FAMILY MEDICINE

## 2024-06-19 PROCEDURE — 1126F AMNT PAIN NOTED NONE PRSNT: CPT | Performed by: FAMILY MEDICINE

## 2024-06-19 PROCEDURE — 2014F MENTAL STATUS ASSESS: CPT | Performed by: FAMILY MEDICINE

## 2024-06-19 PROCEDURE — 99395 PREV VISIT EST AGE 18-39: CPT | Performed by: FAMILY MEDICINE

## 2024-06-19 NOTE — PATIENT INSTRUCTIONS
Preventive Care 21-39 Years Old, Female  Preventive care refers to lifestyle choices and visits with your health care provider that can promote health and wellness. Preventive care visits are also called wellness exams.  What can I expect for my preventive care visit?  Counseling  During your preventive care visit, your health care provider may ask about your:  Medical history, including:  Past medical problems.  Family medical history.  Pregnancy history.  Current health, including:  Menstrual cycle.  Method of birth control.  Emotional well-being.  Home life and relationship well-being.  Sexual activity and sexual health.  Lifestyle, including:  Alcohol, nicotine or tobacco, and drug use.  Access to firearms.  Diet, exercise, and sleep habits.  Work and work environment.  Sunscreen use.  Safety issues such as seatbelt and bike helmet use.  Physical exam  Your health care provider may check your:  Height and weight. These may be used to calculate your BMI (body mass index). BMI is a measurement that tells if you are at a healthy weight.  Waist circumference. This measures the distance around your waistline. This measurement also tells if you are at a healthy weight and may help predict your risk of certain diseases, such as type 2 diabetes and high blood pressure.  Heart rate and blood pressure.  Body temperature.  Skin for abnormal spots.  What immunizations do I need?    Vaccines are usually given at various ages, according to a schedule. Your health care provider will recommend vaccines for you based on your age, medical history, and lifestyle or other factors, such as travel or where you work.  What tests do I need?  Screening  Your health care provider may recommend screening tests for certain conditions. This may include:  Pelvic exam and Pap test.  Lipid and cholesterol levels.  Diabetes screening. This is done by checking your blood sugar (glucose) after you have not eaten for a while (fasting).  Hepatitis  B test.  Hepatitis C test.  HIV (human immunodeficiency virus) test.  STI (sexually transmitted infection) testing, if you are at risk.  BRCA-related cancer screening. This may be done if you have a family history of breast, ovarian, tubal, or peritoneal cancers.  Talk with your health care provider about your test results, treatment options, and if necessary, the need for more tests.  Follow these instructions at home:  Eating and drinking    Eat a healthy diet that includes fresh fruits and vegetables, whole grains, lean protein, and low-fat dairy products.  Take vitamin and mineral supplements as recommended by your health care provider.  Do not drink alcohol if:  Your health care provider tells you not to drink.  You are pregnant, may be pregnant, or are planning to become pregnant.  If you drink alcohol:  Limit how much you have to 0-1 drink a day.  Know how much alcohol is in your drink. In the [place] one drink equals one 12 oz bottle of beer (355 mL), one 5 oz glass of wine (148 mL), or one 1½ oz glass of hard liquor (44 mL).  Lifestyle  Brush your teeth every morning and night with fluoride toothpaste. Floss one time each day.  Exercise for at least 30 minutes 5 or more days each week.  Do not use any products that contain nicotine or tobacco. These products include cigarettes, chewing tobacco, and vaping devices, such as e-cigarettes. If you need help quitting, ask your health care provider.  Do not use drugs.  If you are sexually active, practice safe sex. Use a condom or other form of protection to prevent STIs.  If you do not wish to become pregnant, use a form of birth control. If you plan to become pregnant, see your health care provider for a prepregnancy visit.  Find healthy ways to manage stress, such as:  Meditation, yoga, or listening to music.  Journaling.  Talking to a trusted person.  Spending time with friends and family.  Minimize exposure to UV radiation to reduce your risk of skin  cancer.  Safety  Always wear your seat belt while driving or riding in a vehicle.  Do not drive:  If you have been drinking alcohol. Do not ride with someone who has been drinking.  If you have been using any mind-altering substances or drugs.  While texting.  When you are tired or distracted.  Wear a helmet and other protective equipment during sports activities.  If you have firearms in your house, make sure you follow all gun safety procedures.  Seek help if you have been physically or sexually abused.  What's next?  Go to your health care provider once a year for an annual wellness visit.  Ask your health care provider how often you should have your eyes and teeth checked.  Stay up to date on all vaccines.  This information is not intended to replace advice given to you by your health care provider. Make sure you discuss any questions you have with your health care provider.  Document Revised: 06/15/2022 Document Reviewed: 06/15/2022  Elsevier Patient Education © 2024 [employer]

## 2024-06-19 NOTE — PROGRESS NOTES
Chief Complaint   Patient presents with    Annual Exam     Not fasting       History:  Miley Villalba is a 38 y.o. female who presents today for evaluation of the above problems.      38-year-old female for annual physical          ROS:  Review of Systems   Constitutional:         Going to weight management   Respiratory:  Positive for apnea. Negative for shortness of breath.         Compliant with sleep apnea-will needed with her comorbidities for lifetime   Cardiovascular:  Negative for chest pain and leg swelling.   Genitourinary:         Breast exam and GYN by another provider   Psychiatric/Behavioral:  The patient is nervous/anxious.    All other systems reviewed and are negative.      No Known Allergies  Past Medical History:   Diagnosis Date    Other sleep apnea      Past Surgical History:   Procedure Laterality Date    KNEE CARTILAGE SURGERY Right      Family History   Problem Relation Age of Onset    Alzheimer's disease Mother     Diabetes Father     Diabetes Maternal Aunt     Diabetes Maternal Uncle     Diabetes Paternal Aunt     Diabetes Paternal Uncle       reports that she quit smoking about 10 years ago. Her smoking use included cigarettes. She started smoking about 23 years ago. She has a 13 pack-year smoking history. She has been exposed to tobacco smoke. She has never used smokeless tobacco. She reports current alcohol use. She reports that she does not use drugs.      Current Outpatient Medications:     citalopram (CeleXA) 40 MG tablet, Take 1 tablet by mouth once daily, Disp: 90 tablet, Rfl: 2    lamoTRIgine (LaMICtal) 25 MG tablet, 1 at bedtime for mood stabilizer, Disp: 90 tablet, Rfl: 3    naloxone (NARCAN) 4 MG/0.1ML nasal spray, 1 spray into the nostril(s) as directed by provider As Needed., Disp: , Rfl:     omeprazole (priLOSEC) 40 MG capsule, Take 1 capsule by mouth Daily., Disp: 30 capsule, Rfl: 0    prazosin (MINIPRESS) 1 MG capsule, TAKE 1 CAPSULE BY MOUTH ONCE DAILY AT NIGHT, Disp: 90  "capsule, Rfl: 0    OBJECTIVE:  /78 (BP Location: Left arm, Patient Position: Sitting, Cuff Size: Adult)   Pulse 83   Temp 97 °F (36.1 °C) (Temporal)   Resp 18   Ht 167.6 cm (66\")   Wt (!) 137 kg (302 lb)   LMP 06/08/2024 (Exact Date)   SpO2 98%   BMI 48.74 kg/m²    Physical Exam  Vitals and nursing note reviewed.   Constitutional:       Appearance: She is obese.   HENT:      Right Ear: Tympanic membrane and ear canal normal.      Left Ear: Tympanic membrane and ear canal normal.      Mouth/Throat:      Mouth: Mucous membranes are moist.   Eyes:      Extraocular Movements: Extraocular movements intact.      Pupils: Pupils are equal, round, and reactive to light.   Neck:      Vascular: No carotid bruit.   Cardiovascular:      Rate and Rhythm: Normal rate and regular rhythm.      Pulses: Normal pulses.      Heart sounds: Normal heart sounds.   Pulmonary:      Effort: Pulmonary effort is normal.      Breath sounds: Normal breath sounds.   Abdominal:      General: Abdomen is flat.      Palpations: Abdomen is soft. There is no mass.   Genitourinary:     Comments: Breast and GYN exam by another provider  Musculoskeletal:      Right lower leg: No edema.      Left lower leg: No edema.   Lymphadenopathy:      Cervical: No cervical adenopathy.   Skin:     General: Skin is warm and dry.   Neurological:      General: No focal deficit present.      Mental Status: She is alert and oriented to person, place, and time.   Psychiatric:         Mood and Affect: Mood normal.         Behavior: Behavior normal.         Thought Content: Thought content normal.         Judgment: Judgment normal.                 Health Maintenance:  Immunization History   Administered Date(s) Administered    COVID-19 (MODERNA) 1st,2nd,3rd Dose Monovalent 09/16/2021, 10/22/2021    Hep B, Adolescent or Pediatric 10/24/2002    MMR 06/14/2005    Td (TDVAX) 02/03/1997    Tdap 01/08/2023        Up-to-date-GYN and breast exam by another " provider    Assessment/Plan    Diagnoses and all orders for this visit:    1. Routine general medical examination at a health care facility (Primary)  -     Lipid Panel  -     TSH    2. Secondary hypertension  -     POC Urinalysis Dipstick, Multipro    Plan above-continue weight management      An After Visit Summary was printed and given to the patient at discharge.  Return in about 6 months (around 12/19/2024).         Eugenio Yang MD 6/19/2024   Electronically signed.

## 2024-06-19 NOTE — PROGRESS NOTES
June 19, 2024    Hello, may I speak with Miley Villalba?    My name is Lisbeth      I am  with [facility]  [address], [address]  [place] [address] [phone].    Before we get started may I verify your date of birth? 1985    I am calling to officially welcome you to our practice and ask about your recent visit. Is this a good time to talk? yes    Tell me about your visit with us. What things went well?  everything was ok       We're always looking for ways to make our patients' experiences even better. Do you have recommendations on ways we may improve?  none    Overall were you satisfied with your first visit to our practice? yes       I appreciate you taking the time to speak with me today. Is there anything else I can do for you? no      Thank you, and have a great day.

## 2024-06-20 LAB
A-TOCOPHEROL VIT E SERPL-MCNC: 6.8 MG/L (ref 5.9–19.4)
CHOLEST SERPL-MCNC: 150 MG/DL (ref 0–200)
GAMMA TOCOPHEROL SERPL-MCNC: 1.8 MG/L (ref 0.7–4.9)
HDLC SERPL-MCNC: 40 MG/DL (ref 40–60)
LDLC SERPL CALC-MCNC: 75 MG/DL (ref 0–100)
TRIGL SERPL-MCNC: 210 MG/DL (ref 0–150)
TSH SERPL DL<=0.005 MIU/L-ACNC: 0.89 UIU/ML (ref 0.27–4.2)
VIT A SERPL-MCNC: 27.4 UG/DL (ref 18.9–57.3)
VLDLC SERPL CALC-MCNC: 35 MG/DL (ref 5–40)

## 2024-06-25 ENCOUNTER — TELEPHONE (OUTPATIENT)
Dept: BARIATRICS/WEIGHT MGMT | Facility: CLINIC | Age: 39
End: 2024-06-25
Payer: COMMERCIAL

## 2024-06-25 DIAGNOSIS — E61.1 IRON DEFICIENCY: ICD-10-CM

## 2024-06-25 DIAGNOSIS — R53.83 OTHER FATIGUE: Primary | ICD-10-CM

## 2024-06-25 NOTE — TELEPHONE ENCOUNTER
----- Message from April Arteaga sent at 6/25/2024  1:28 PM CDT -----  Iron is low, begin to increase iron in diet. I will order a repeat for 4 weeks

## 2024-07-03 DIAGNOSIS — R00.2 PALPITATIONS: ICD-10-CM

## 2024-07-03 NOTE — TELEPHONE ENCOUNTER
Rx Refill Note  Requested Prescriptions     Pending Prescriptions Disp Refills    omeprazole (priLOSEC) 40 MG capsule [Pharmacy Med Name: Omeprazole 40 MG Oral Capsule Delayed Release] 30 capsule 0     Sig: Take 1 capsule by mouth once daily      Last office visit with prescribing clinician: 5/30/2024   Last telemedicine visit with prescribing clinician: Visit date not found   Next office visit with prescribing clinician: 12/19/24  {    Sue Ann MA  07/03/24, 15:15 CDT

## 2024-07-05 RX ORDER — OMEPRAZOLE 40 MG/1
40 CAPSULE, DELAYED RELEASE ORAL DAILY
Qty: 30 CAPSULE | Refills: 0 | Status: SHIPPED | OUTPATIENT
Start: 2024-07-05

## 2024-07-09 ENCOUNTER — OFFICE VISIT (OUTPATIENT)
Dept: OBSTETRICS AND GYNECOLOGY | Age: 39
End: 2024-07-09
Payer: COMMERCIAL

## 2024-07-09 VITALS
HEIGHT: 66 IN | DIASTOLIC BLOOD PRESSURE: 78 MMHG | SYSTOLIC BLOOD PRESSURE: 108 MMHG | WEIGHT: 293 LBS | BODY MASS INDEX: 47.09 KG/M2

## 2024-07-09 DIAGNOSIS — Z01.419 WELL WOMAN EXAM WITH ROUTINE GYNECOLOGICAL EXAM: Primary | ICD-10-CM

## 2024-07-09 DIAGNOSIS — Z12.31 ENCOUNTER FOR SCREENING MAMMOGRAM FOR BREAST CANCER: ICD-10-CM

## 2024-07-09 PROCEDURE — G0123 SCREEN CERV/VAG THIN LAYER: HCPCS | Performed by: NURSE PRACTITIONER

## 2024-07-09 PROCEDURE — 87624 HPV HI-RISK TYP POOLED RSLT: CPT | Performed by: NURSE PRACTITIONER

## 2024-07-09 NOTE — PROGRESS NOTES
"Chief Complaint   Patient presents with    Gynecologic Exam     [facility] transfer here for annual, last pap 10/1/21, patient denies any problems or concerns.         Subjective     Miley Villalba is a 38 y.o. female    History of Present Illness  Pt comes in today for annual wellness exam. Period are normal at 28-30 days with only about 3 days of bleeding. Pt has female partner. Pt declines birth control.       /78 (BP Location: Left arm, Patient Position: Sitting, Cuff Size: Large Adult)   Ht 167.6 cm (66\")   Wt 135 kg (297 lb)   LMP 06/08/2024 (Exact Date)   BMI 47.94 kg/m²     Outpatient Encounter Medications as of 7/9/2024   Medication Sig Dispense Refill    citalopram (CeleXA) 40 MG tablet Take 1 tablet by mouth once daily 90 tablet 2    lamoTRIgine (LaMICtal) 25 MG tablet 1 at bedtime for mood stabilizer 90 tablet 3    omeprazole (priLOSEC) 40 MG capsule Take 1 capsule by mouth once daily 30 capsule 0    prazosin (MINIPRESS) 1 MG capsule TAKE 1 CAPSULE BY MOUTH ONCE DAILY AT NIGHT 90 capsule 0    [DISCONTINUED] naloxone (NARCAN) 4 MG/0.1ML nasal spray 1 spray into the nostril(s) as directed by provider As Needed. (Patient not taking: Reported on 7/9/2024)       No facility-administered encounter medications on file as of 7/9/2024.       Past Medical History  Past Medical History:   Diagnosis Date    Other sleep apnea         Surgical History  Past Surgical History:   Procedure Laterality Date    KNEE CARTILAGE SURGERY Right        Family History  Family History   Problem Relation Age of Onset    Diabetes Father     Alzheimer's disease Mother     Melanoma Maternal Grandfather     Diabetes Maternal Aunt     Diabetes Maternal Uncle     Diabetes Paternal Aunt     Diabetes Paternal Uncle        The following portions of the patient's history were reviewed and updated as appropriate: allergies, current medications, past family history, past medical history, past social history, past surgical history, and " problem list.    Review of Systems   Constitutional:  Negative for activity change and unexpected weight loss.   Cardiovascular:  Negative for chest pain.   Gastrointestinal:  Negative for blood in stool, constipation and diarrhea.   Endocrine: Negative for cold intolerance and heat intolerance.   Genitourinary:  Negative for dyspareunia, pelvic pain and vaginal discharge.   Musculoskeletal:  Negative for arthralgias, back pain, neck pain and neck stiffness.   Skin:  Negative for rash.   Neurological:  Negative for dizziness and headache.   Psychiatric/Behavioral:  Negative for sleep disturbance. The patient is not nervous/anxious.        Objective   Physical Exam  Vitals and nursing note reviewed. Exam conducted with a chaperone present.   Constitutional:       General: She is not in acute distress.     Appearance: She is well-developed. She is obese. She is not diaphoretic.   HENT:      Head: Normocephalic.      Right Ear: External ear normal.      Left Ear: External ear normal.      Nose: Nose normal.   Eyes:      General: No scleral icterus.        Right eye: No discharge.         Left eye: No discharge.      Conjunctiva/sclera: Conjunctivae normal.      Pupils: Pupils are equal, round, and reactive to light.   Neck:      Thyroid: No thyromegaly.      Vascular: No carotid bruit.      Trachea: No tracheal deviation.   Cardiovascular:      Rate and Rhythm: Normal rate and regular rhythm.      Heart sounds: Normal heart sounds. No murmur heard.  Pulmonary:      Effort: Pulmonary effort is normal. No respiratory distress.      Breath sounds: Normal breath sounds. No wheezing.   Chest:   Breasts:     Breasts are symmetrical.      Right: Normal. No swelling, bleeding, inverted nipple, mass, nipple discharge, skin change or tenderness.      Left: Normal. No swelling, bleeding, inverted nipple, mass, nipple discharge, skin change or tenderness.   Abdominal:      General: There is no distension.      Palpations: Abdomen  is soft. There is no mass.      Tenderness: There is no abdominal tenderness. There is no right CVA tenderness, left CVA tenderness or guarding.      Hernia: No hernia is present. There is no hernia in the left inguinal area or right inguinal area.   Genitourinary:     General: Normal vulva.      Exam position: Lithotomy position.      Labia:         Right: No rash, tenderness, lesion or injury.         Left: No rash, tenderness, lesion or injury.       Vagina: Normal. No signs of injury and foreign body. No vaginal discharge, erythema, tenderness or bleeding.      Cervix: Normal.      Uterus: Normal. Not enlarged, not fixed and not tender.       Adnexa: Right adnexa normal and left adnexa normal.        Right: No mass, tenderness or fullness.          Left: No mass, tenderness or fullness.        Rectum: Normal. No mass.      Comments:   BSU normal  Urethral meatus  Normal  Perineum  Normal  Musculoskeletal:         General: No tenderness. Normal range of motion.      Cervical back: Normal range of motion and neck supple.   Lymphadenopathy:      Head:      Right side of head: No submental, submandibular, tonsillar, preauricular, posterior auricular or occipital adenopathy.      Left side of head: No submental, submandibular, tonsillar, preauricular, posterior auricular or occipital adenopathy.      Cervical: No cervical adenopathy.      Right cervical: No superficial, deep or posterior cervical adenopathy.     Left cervical: No superficial, deep or posterior cervical adenopathy.      Upper Body:      Right upper body: No supraclavicular, axillary or pectoral adenopathy.      Left upper body: No supraclavicular, axillary or pectoral adenopathy.      Lower Body: No right inguinal adenopathy. No left inguinal adenopathy.   Skin:     General: Skin is warm and dry.      Findings: No bruising, erythema or rash.   Neurological:      Mental Status: She is alert and oriented to person, place, and time.      Coordination:  Coordination normal.   Psychiatric:         Mood and Affect: Mood normal.         Behavior: Behavior normal.         Thought Content: Thought content normal.         Judgment: Judgment normal.           Assessment & Plan   Diagnoses and all orders for this visit:    1. Well woman exam with routine gynecological exam (Primary)  -     Liquid-based Pap Smear, Screening    2. Encounter for screening mammogram for breast cancer  -     Mammo screening digital tomosynthesis bilateral w CAD; Future         Normal GYN exam. Encouraged SBE, pt is aware how to do self breast exam and the importance of same. Discussed weight management and importance of maintaining a healthy weight. Discussed Vitamin D intake and the importance of adequate vitamin D for both bone health and a healthy immune system.  Discussed daily exercise and the importance of same, in regards to a healthy heart as well as helping to maintain her weight and improving her mental health.  Colonoscopy is not indicated. Mammogram will be scheduled. Bone density is not indicated. Pap smear is done per ASCCP guidelines. HPV is done. Lab work up is up to date through PCP and bariatrics.     Pt declines history of abnormal pap.          Savannah Potter, APRN  7/9/2024    Return in about 1 year (around 7/9/2025) for Annual physical.

## 2024-07-11 LAB
GEN CATEG CVX/VAG CYTO-IMP: NORMAL
HPV I/H RISK 4 DNA CVX QL PROBE+SIG AMP: NOT DETECTED
LAB AP CASE REPORT: NORMAL
LAB AP GYN ADDITIONAL INFORMATION: NORMAL
LAB AP GYN OTHER FINDINGS: NORMAL
Lab: NORMAL
PATH INTERP SPEC-IMP: NORMAL
STAT OF ADQ CVX/VAG CYTO-IMP: NORMAL

## 2024-07-17 ENCOUNTER — OFFICE VISIT (OUTPATIENT)
Dept: BARIATRICS/WEIGHT MGMT | Facility: CLINIC | Age: 39
End: 2024-07-17
Payer: COMMERCIAL

## 2024-07-17 VITALS
BODY MASS INDEX: 47.09 KG/M2 | DIASTOLIC BLOOD PRESSURE: 83 MMHG | HEIGHT: 66 IN | HEART RATE: 70 BPM | OXYGEN SATURATION: 96 % | SYSTOLIC BLOOD PRESSURE: 120 MMHG | WEIGHT: 293 LBS | TEMPERATURE: 98.4 F

## 2024-07-17 DIAGNOSIS — E61.1 IRON DEFICIENCY: ICD-10-CM

## 2024-07-17 DIAGNOSIS — E66.01 CLASS 3 SEVERE OBESITY DUE TO EXCESS CALORIES WITH SERIOUS COMORBIDITY AND BODY MASS INDEX (BMI) OF 45.0 TO 49.9 IN ADULT: Primary | ICD-10-CM

## 2024-07-17 DIAGNOSIS — G47.39 OTHER SLEEP APNEA: ICD-10-CM

## 2024-07-17 NOTE — ASSESSMENT & PLAN NOTE
Patient's (Body mass index is 48.36 kg/m².) indicates that they are morbidly/severely obese (BMI > 40 or > 35 with obesity - related health condition) with health conditions that include obstructive sleep apnea . Weight is improving with treatment. BMI  is above average; BMI management plan is completed. We discussed portion control and increasing exercise.

## 2024-07-17 NOTE — PROGRESS NOTES
"Patient Care Team:  Eugenio Yang MD as PCP - General (Family Medicine)    Reason for Visit:  Medical Weight Loss      Subjective   History of Present Illness  Miley Villalba is a 38 y.o. female. The patient is here today for her third visit for medical weight loss.    The patient, who states she is a stress eater, has been experiencing significant stress since her last appointment.  She has incorporated CrossFit into her routine three times weekly, which she reports as beneficial. She engages in CrossFit in the afternoon on Mondays, Wednesdays, and Saturdays and states it has mitigated hunger post-exercise which assist with stress eating on those nights. She also engages in walking and jogging, a distance of 0.25 miles, which she finds beneficial. She maintains a food journal and consumes 2 to 3 meals daily, and admits to struggling with the meal plan on the weekends. She has increased her protein intake to 30 g, but reports that her protein intake is insufficient, leading to persistent fatigue. Her sleep is insufficient, which she attributes to her eating habits. She denies consumption of sodas and maintains adequate hydration, consuming at least 64 ounces of water daily. Her protein intake is between 40 to 60 g. She attends bi-weekly counseling sessions for post-traumatic stress disorder (PTSD) and other related issues.    The following portions of the patient's history were reviewed and updated as appropriate: allergies, current medications, past family history, past medical history, past social history, past surgical history, and problem list.    Objective   /83 (BP Location: Right arm, Patient Position: Sitting, Cuff Size: Adult)   Pulse 70   Temp 98.4 °F (36.9 °C)   Ht 167.6 cm (66\")   Wt 136 kg (299 lb 9.6 oz)   LMP 06/08/2024 (Exact Date)   SpO2 96%   BMI 48.36 kg/m²       07/17/24  1030   Weight: 136 kg (299 lb 9.6 oz)            Physical Exam  Vitals reviewed.   Constitutional:      "  Appearance: She is obese.   Cardiovascular:      Rate and Rhythm: Normal rate and regular rhythm.   Pulmonary:      Effort: Pulmonary effort is normal.   Abdominal:      General: Bowel sounds are normal.      Palpations: Abdomen is soft.   Musculoskeletal:         General: Normal range of motion.   Skin:     General: Skin is warm and dry.   Neurological:      Mental Status: She is alert and oriented to person, place, and time.   Psychiatric:         Mood and Affect: Mood normal.         Behavior: Behavior normal.       Physical Exam  Vital Signs  BMI is 48.         Assessment & Plan   Diagnoses and all orders for this visit:    1. Class 3 severe obesity due to excess calories with serious comorbidity and body mass index (BMI) of 45.0 to 49.9 in adult (Primary)  Assessment & Plan:  Patient's (Body mass index is 48.36 kg/m².) indicates that they are morbidly/severely obese (BMI > 40 or > 35 with obesity - related health condition) with health conditions that include obstructive sleep apnea . Weight is improving with treatment. BMI  is above average; BMI management plan is completed. We discussed portion control and increasing exercise.       2. Other sleep apnea    3. Iron deficiency  -     Iron Profile; Future       Assessment & Plan  1. Class 3 obesity.  The patient is advised to persist with the green prescription meal plan, maintain logging, and reduce stress eating. If she chooses to stress eat she was advised to choose a food from the free food list. Advised to reduce carbohydrate intake at night.    2. Iron deficiency.  An iron panel will be repeated. Should the panel remain low, over-the-counter iron supplementation is recommended, along with increased fiber intake to alleviate constipation.    Follow-up  A follow-up appointment is scheduled for 4 to 6 weeks from now.  Miley Villalba was seen today for follow-up, obesity, nutrition counseling and weight loss.    Today we discussed healthy changes in  lifestyle, diet, and exercise. Dietician consultation obtained.  Miley Villalba had received handouts to her explaining the recommendation on portion sizes/appetite control/reading nutrition labels.   Intensive behavioral therapy for obesity was done today as well.       Follow up in 1 month for a weight recheck.    Patient will continue GREEN meal plan.       Patient or patient representative verbalized consent for the use of Ambient Listening during the visit with  ARYA Kaye for chart documentation. 7/17/2024  10:36 CDT

## 2024-07-30 ENCOUNTER — TELEPHONE (OUTPATIENT)
Dept: BARIATRICS/WEIGHT MGMT | Facility: CLINIC | Age: 39
End: 2024-07-30
Payer: COMMERCIAL

## 2024-07-30 NOTE — TELEPHONE ENCOUNTER
I tried to contact the patient to remind her that she needs to recheck her iron level and that the order is in for her to do so, but her voicemail was full and she didn't answer.

## 2024-08-01 LAB
NCCN CRITERIA FLAG: NORMAL
TYRER CUZICK SCORE: 8.2

## 2024-08-02 ENCOUNTER — HOSPITAL ENCOUNTER (OUTPATIENT)
Dept: MAMMOGRAPHY | Facility: HOSPITAL | Age: 39
Discharge: HOME | End: 2024-08-02
Admitting: NURSE PRACTITIONER
Payer: COMMERCIAL

## 2024-08-02 DIAGNOSIS — Z12.31 ENCOUNTER FOR SCREENING MAMMOGRAM FOR BREAST CANCER: ICD-10-CM

## 2024-08-02 PROCEDURE — 77067 SCR MAMMO BI INCL CAD: CPT

## 2024-08-02 PROCEDURE — 77063 BREAST TOMOSYNTHESIS BI: CPT

## 2024-08-19 RX ORDER — LAMOTRIGINE 25 MG/1
TABLET ORAL
Qty: 90 TABLET | Refills: 1 | Status: SHIPPED | OUTPATIENT
Start: 2024-08-19

## 2024-08-19 NOTE — TELEPHONE ENCOUNTER
Rx Refill Note  Requested Prescriptions     Pending Prescriptions Disp Refills    lamoTRIgine (LaMICtal) 25 MG tablet [Pharmacy Med Name: lamoTRIgine 25 MG Oral Tablet] 90 tablet 0     Sig: TAKE 1 TABLET BY MOUTH AT BEDTIME FOR  MOOD  STABILIZER      Last office visit with prescribing clinician: 6/19/2024   Last telemedicine visit with prescribing clinician: Visit date not found   Next office visit with prescribing clinician: 12/19/2024     Kelly Mcelroy MA  08/19/24, 08:23 CDT

## 2024-08-20 DIAGNOSIS — R00.2 PALPITATIONS: ICD-10-CM

## 2024-08-21 RX ORDER — OMEPRAZOLE 40 MG/1
40 CAPSULE, DELAYED RELEASE ORAL DAILY
Qty: 90 CAPSULE | Refills: 3 | Status: SHIPPED | OUTPATIENT
Start: 2024-08-21

## 2024-08-21 NOTE — TELEPHONE ENCOUNTER
Rx Refill Note  Requested Prescriptions     Pending Prescriptions Disp Refills    omeprazole (priLOSEC) 40 MG capsule [Pharmacy Med Name: Omeprazole 40 MG Oral Capsule Delayed Release] 30 capsule 0     Sig: Take 1 capsule by mouth once daily      Last office visit with prescribing clinician: 6/19/2024   Last telemedicine visit with prescribing clinician: Visit date not found   Next office visit with prescribing clinician: 12/19/2024       Kelly Mcelroy MA  08/21/24, 07:13 CDT

## 2024-08-23 ENCOUNTER — OFFICE VISIT (OUTPATIENT)
Dept: FAMILY MEDICINE CLINIC | Facility: CLINIC | Age: 39
End: 2024-08-23
Payer: COMMERCIAL

## 2024-08-23 VITALS
SYSTOLIC BLOOD PRESSURE: 112 MMHG | BODY MASS INDEX: 47.09 KG/M2 | DIASTOLIC BLOOD PRESSURE: 76 MMHG | OXYGEN SATURATION: 98 % | HEART RATE: 71 BPM | WEIGHT: 293 LBS | HEIGHT: 66 IN | TEMPERATURE: 98 F

## 2024-08-23 DIAGNOSIS — M77.51 TENDINITIS OF RIGHT FOOT: Primary | ICD-10-CM

## 2024-08-23 PROCEDURE — 1160F RVW MEDS BY RX/DR IN RCRD: CPT | Performed by: NURSE PRACTITIONER

## 2024-08-23 PROCEDURE — 1159F MED LIST DOCD IN RCRD: CPT | Performed by: NURSE PRACTITIONER

## 2024-08-23 PROCEDURE — 1126F AMNT PAIN NOTED NONE PRSNT: CPT | Performed by: NURSE PRACTITIONER

## 2024-08-23 PROCEDURE — 99213 OFFICE O/P EST LOW 20 MIN: CPT | Performed by: NURSE PRACTITIONER

## 2024-08-23 RX ORDER — PREDNISONE 10 MG/1
TABLET ORAL
Qty: 21 TABLET | Refills: 0 | Status: SHIPPED | OUTPATIENT
Start: 2024-08-23

## 2024-08-23 RX ORDER — TRIAMCINOLONE ACETONIDE 40 MG/ML
40 INJECTION, SUSPENSION INTRA-ARTICULAR; INTRAMUSCULAR ONCE
Status: COMPLETED | OUTPATIENT
Start: 2024-08-23 | End: 2024-08-23

## 2024-08-23 RX ADMIN — TRIAMCINOLONE ACETONIDE 40 MG: 40 INJECTION, SUSPENSION INTRA-ARTICULAR; INTRAMUSCULAR at 16:06

## 2024-08-23 NOTE — PROGRESS NOTES
CC: right foot pain    History:  Miley Villalba is a 38 y.o. female who presents today for evaluation of the above problems.      Patient complains of right foot pain in the top of the foot. Has been hurting for about week and getting worse instead of better. Woke up a little sore last Friday morning and got progressively worse throughout the day. She does do Cross-fit three times a week, but there has been no known injury. There is no swelling or bruising.     HPI  ROS:  Review of Systems   Musculoskeletal:         Right foot pain       No Known Allergies  Past Medical History:   Diagnosis Date    Other sleep apnea      Past Surgical History:   Procedure Laterality Date    KNEE CARTILAGE SURGERY Right      Family History   Problem Relation Age of Onset    Diabetes Father     Alzheimer's disease Mother     Melanoma Maternal Grandfather     Diabetes Maternal Aunt     Diabetes Maternal Uncle     Diabetes Paternal Aunt     Diabetes Paternal Uncle       reports that she quit smoking about 10 years ago. Her smoking use included cigarettes. She started smoking about 23 years ago. She has a 13 pack-year smoking history. She has been exposed to tobacco smoke. She has never used smokeless tobacco. She reports current alcohol use. She reports that she does not use drugs.      Current Outpatient Medications:     citalopram (CeleXA) 40 MG tablet, Take 1 tablet by mouth once daily, Disp: 90 tablet, Rfl: 2    lamoTRIgine (LaMICtal) 25 MG tablet, TAKE 1 TABLET BY MOUTH AT BEDTIME FOR  MOOD  STABILIZER, Disp: 90 tablet, Rfl: 1    omeprazole (priLOSEC) 40 MG capsule, Take 1 capsule by mouth once daily, Disp: 90 capsule, Rfl: 3    prazosin (MINIPRESS) 1 MG capsule, TAKE 1 CAPSULE BY MOUTH ONCE DAILY AT NIGHT, Disp: 90 capsule, Rfl: 0    predniSONE (DELTASONE) 10 MG (21) dose pack, Use as directed on package, Disp: 21 tablet, Rfl: 0    Current Facility-Administered Medications:     triamcinolone acetonide (KENALOG-40) injection 40 mg,  "40 mg, Intramuscular, Once, Shivani Miller, APRN    OBJECTIVE:  /76 (BP Location: Right arm, Patient Position: Sitting, Cuff Size: Large Adult)   Pulse 71   Temp 98 °F (36.7 °C) (Temporal)   Ht 167.6 cm (66\")   Wt 135 kg (297 lb 9.6 oz)   LMP 07/05/2024 (Approximate)   SpO2 98%   BMI 48.03 kg/m²    Physical Exam  Vitals reviewed.   Constitutional:       Appearance: She is well-developed.   Cardiovascular:      Rate and Rhythm: Normal rate.   Pulmonary:      Effort: Pulmonary effort is normal.   Musculoskeletal:        Feet:    Neurological:      Mental Status: She is alert and oriented to person, place, and time.   Psychiatric:         Behavior: Behavior normal.         Assessment/Plan    Diagnoses and all orders for this visit:    1. Tendinitis of right foot (Primary)  -     triamcinolone acetonide (KENALOG-40) injection 40 mg  -     predniSONE (DELTASONE) 10 MG (21) dose pack; Use as directed on package  Dispense: 21 tablet; Refill: 0        An After Visit Summary was printed and given to the patient at discharge.  Return if symptoms worsen or fail to improve, for Next scheduled follow up.       ARYA Jacques 8/23/24    Electronically signed.  "

## 2024-08-29 DIAGNOSIS — M77.51 TENDINITIS OF RIGHT FOOT: Primary | ICD-10-CM

## 2024-09-02 DIAGNOSIS — R42 DIZZINESS: ICD-10-CM

## 2024-09-03 RX ORDER — PRAZOSIN HYDROCHLORIDE 1 MG/1
1 CAPSULE ORAL NIGHTLY
Qty: 90 CAPSULE | Refills: 0 | Status: SHIPPED | OUTPATIENT
Start: 2024-09-03

## 2024-09-03 NOTE — TELEPHONE ENCOUNTER
Rx Refill Note  Requested Prescriptions     Pending Prescriptions Disp Refills    prazosin (MINIPRESS) 1 MG capsule [Pharmacy Med Name: Prazosin HCl 1 MG Oral Capsule] 90 capsule 0     Sig: TAKE 1 CAPSULE BY MOUTH ONCE DAILY AT NIGHT      Last office visit with prescribing clinician: 6/19/2024   Last telemedicine visit with prescribing clinician: Visit date not found   Next office visit with prescribing clinician: 12/19/2024                         Would you like a call back once the refill request has been completed: [] Yes [] No    If the office needs to give you a call back, can they leave a voicemail: [] Yes [] No    Holly Koch, PCT  09/03/24, 15:43 CDT

## 2024-09-17 ENCOUNTER — OFFICE VISIT (OUTPATIENT)
Dept: FAMILY MEDICINE CLINIC | Facility: CLINIC | Age: 39
End: 2024-09-17
Payer: COMMERCIAL

## 2024-09-17 VITALS
HEART RATE: 74 BPM | DIASTOLIC BLOOD PRESSURE: 69 MMHG | SYSTOLIC BLOOD PRESSURE: 104 MMHG | HEIGHT: 66 IN | OXYGEN SATURATION: 98 % | TEMPERATURE: 98.2 F | BODY MASS INDEX: 47.09 KG/M2 | WEIGHT: 293 LBS

## 2024-09-17 DIAGNOSIS — J02.9 SORE THROAT: Primary | ICD-10-CM

## 2024-09-17 LAB
EXPIRATION DATE: NORMAL
FLUAV AG UPPER RESP QL IA.RAPID: NOT DETECTED
FLUBV AG UPPER RESP QL IA.RAPID: NOT DETECTED
INTERNAL CONTROL: NORMAL
Lab: NORMAL
SARS-COV-2 AG UPPER RESP QL IA.RAPID: NOT DETECTED

## 2024-09-17 PROCEDURE — 1160F RVW MEDS BY RX/DR IN RCRD: CPT | Performed by: NURSE PRACTITIONER

## 2024-09-17 PROCEDURE — 99213 OFFICE O/P EST LOW 20 MIN: CPT | Performed by: NURSE PRACTITIONER

## 2024-09-17 PROCEDURE — 87428 SARSCOV & INF VIR A&B AG IA: CPT | Performed by: NURSE PRACTITIONER

## 2024-09-17 PROCEDURE — 1126F AMNT PAIN NOTED NONE PRSNT: CPT | Performed by: NURSE PRACTITIONER

## 2024-09-17 PROCEDURE — 1159F MED LIST DOCD IN RCRD: CPT | Performed by: NURSE PRACTITIONER

## 2024-09-18 ENCOUNTER — NUTRITION (OUTPATIENT)
Dept: BARIATRICS/WEIGHT MGMT | Facility: CLINIC | Age: 39
End: 2024-09-18
Payer: COMMERCIAL

## 2024-09-18 VITALS — BODY MASS INDEX: 47.09 KG/M2 | WEIGHT: 293 LBS | HEIGHT: 66 IN

## 2024-09-18 DIAGNOSIS — E66.01 CLASS 3 SEVERE OBESITY DUE TO EXCESS CALORIES WITH SERIOUS COMORBIDITY AND BODY MASS INDEX (BMI) OF 45.0 TO 49.9 IN ADULT: Primary | ICD-10-CM

## 2024-09-18 PROCEDURE — 97803 MED NUTRITION INDIV SUBSEQ: CPT

## 2024-09-27 ENCOUNTER — OFFICE VISIT (OUTPATIENT)
Dept: FAMILY MEDICINE CLINIC | Facility: CLINIC | Age: 39
End: 2024-09-27
Payer: COMMERCIAL

## 2024-09-27 ENCOUNTER — PRIOR AUTHORIZATION (OUTPATIENT)
Dept: FAMILY MEDICINE CLINIC | Facility: CLINIC | Age: 39
End: 2024-09-27

## 2024-09-27 VITALS
HEART RATE: 61 BPM | TEMPERATURE: 97.8 F | OXYGEN SATURATION: 98 % | WEIGHT: 293 LBS | DIASTOLIC BLOOD PRESSURE: 84 MMHG | HEIGHT: 66 IN | SYSTOLIC BLOOD PRESSURE: 126 MMHG | BODY MASS INDEX: 47.09 KG/M2

## 2024-09-27 DIAGNOSIS — F33.1 MODERATE EPISODE OF RECURRENT MAJOR DEPRESSIVE DISORDER: Primary | ICD-10-CM

## 2024-09-27 PROBLEM — F32.1 CURRENT MODERATE EPISODE OF MAJOR DEPRESSIVE DISORDER WITHOUT PRIOR EPISODE: Status: ACTIVE | Noted: 2024-09-27

## 2024-09-27 PROCEDURE — 99213 OFFICE O/P EST LOW 20 MIN: CPT | Performed by: NURSE PRACTITIONER

## 2024-09-27 PROCEDURE — 1126F AMNT PAIN NOTED NONE PRSNT: CPT | Performed by: NURSE PRACTITIONER

## 2024-09-27 RX ORDER — BUSPIRONE HYDROCHLORIDE 7.5 MG/1
7.5 TABLET ORAL 3 TIMES DAILY
Qty: 60 TABLET | Refills: 0 | Status: SHIPPED | OUTPATIENT
Start: 2024-09-27

## 2024-10-09 ENCOUNTER — OFFICE VISIT (OUTPATIENT)
Dept: FAMILY MEDICINE CLINIC | Facility: CLINIC | Age: 39
End: 2024-10-09
Payer: COMMERCIAL

## 2024-10-09 VITALS
OXYGEN SATURATION: 98 % | HEIGHT: 66 IN | SYSTOLIC BLOOD PRESSURE: 116 MMHG | WEIGHT: 293 LBS | DIASTOLIC BLOOD PRESSURE: 79 MMHG | BODY MASS INDEX: 47.09 KG/M2 | HEART RATE: 76 BPM | TEMPERATURE: 98.4 F

## 2024-10-09 DIAGNOSIS — F41.9 ANXIETY: ICD-10-CM

## 2024-10-09 DIAGNOSIS — F32.1 CURRENT MODERATE EPISODE OF MAJOR DEPRESSIVE DISORDER WITHOUT PRIOR EPISODE: Primary | ICD-10-CM

## 2024-10-09 PROBLEM — F33.1 MODERATE EPISODE OF RECURRENT MAJOR DEPRESSIVE DISORDER: Status: ACTIVE | Noted: 2024-10-09

## 2024-10-09 NOTE — ASSESSMENT & PLAN NOTE
Continue current medication regimen.  Will discontinue Vraylar.  Reports symptoms are stable at this time.  If any changes and worsening depression or anxiety patient is to follow-up.

## 2024-10-09 NOTE — PROGRESS NOTES
CC:   Chief Complaint   Patient presents with    Anxiety     2 week follow up.  Patient is wanting to stop medication due to some issues she is having        History:  Miley Villalba is a 38 y.o. female who presents today for evaluation of the above problems.      HPI  Patient presents for follow-up on depression/anxiety.  States she does not like the Vraylar due to side effect of weight gain.  Has gained 5 pounds in the last 2 weeks and has worked really hard at losing weight in the past.  However, patient does feel like BuSpar is very beneficial.  States she can tell it calms her nerves within 30 minutes of taking it.  She is only taking as needed for high anxiety and working well for her.  Patient feels her current medication regimen minus the Vraylar is controlling symptoms better.  Denies any suicidal thoughts.    No Known Allergies  Past Medical History:   Diagnosis Date    Other sleep apnea      Past Surgical History:   Procedure Laterality Date    KNEE CARTILAGE SURGERY Right      Family History   Problem Relation Age of Onset    Diabetes Father     Alzheimer's disease Mother     Melanoma Maternal Grandfather     Diabetes Maternal Aunt     Diabetes Maternal Uncle     Diabetes Paternal Aunt     Diabetes Paternal Uncle       reports that she quit smoking about 10 years ago. Her smoking use included cigarettes. She started smoking about 23 years ago. She has a 13 pack-year smoking history. She has been exposed to tobacco smoke. She has never used smokeless tobacco. She reports current alcohol use. She reports that she does not use drugs.      Current Outpatient Medications:     busPIRone (BUSPAR) 7.5 MG tablet, Take 1 tablet by mouth 3 (Three) Times a Day., Disp: 60 tablet, Rfl: 0    citalopram (CeleXA) 40 MG tablet, Take 1 tablet by mouth once daily, Disp: 90 tablet, Rfl: 2    lamoTRIgine (LaMICtal) 25 MG tablet, TAKE 1 TABLET BY MOUTH AT BEDTIME FOR  MOOD  STABILIZER, Disp: 90 tablet, Rfl: 1    omeprazole  "(priLOSEC) 40 MG capsule, Take 1 capsule by mouth once daily, Disp: 90 capsule, Rfl: 3    prazosin (MINIPRESS) 1 MG capsule, TAKE 1 CAPSULE BY MOUTH ONCE DAILY AT NIGHT, Disp: 90 capsule, Rfl: 0    OBJECTIVE:  /79 (BP Location: Right arm, Patient Position: Sitting, Cuff Size: Large Adult)   Pulse 76   Temp 98.4 °F (36.9 °C) (Temporal)   Ht 167.6 cm (66\")   Wt (!) 139 kg (305 lb 9.6 oz)   SpO2 98%   BMI 49.33 kg/m²    Estimated body mass index is 49.33 kg/m² as calculated from the following:    Height as of this encounter: 167.6 cm (66\").    Weight as of this encounter: 139 kg (305 lb 9.6 oz).                  Physical Exam  Vitals reviewed.   Constitutional:       General: She is not in acute distress.     Appearance: Normal appearance. She is obese.   HENT:      Head: Normocephalic and atraumatic.   Cardiovascular:      Rate and Rhythm: Normal rate and regular rhythm.      Heart sounds: Normal heart sounds.   Pulmonary:      Effort: No respiratory distress.      Breath sounds: Normal breath sounds. No wheezing.   Musculoskeletal:         General: Normal range of motion.   Skin:     General: Skin is warm and dry.   Neurological:      Mental Status: She is alert and oriented to person, place, and time.   Psychiatric:         Mood and Affect: Mood normal.         Behavior: Behavior normal.              Assessment/Plan    Diagnoses and all orders for this visit:    1. Current moderate episode of major depressive disorder without prior episode (Primary)  Assessment & Plan:  Continue current medication regimen.  Will discontinue Vraylar.  Reports symptoms are stable at this time.  If any changes and worsening depression or anxiety patient is to follow-up.      2. Anxiety    Advised to discontinue Vraylar. Will continue using Buspar PRN.   F/u if any concerns.            An After Visit Summary was printed and given to the patient at discharge.  Return in about 2 months (around 12/9/2024) for Recheck " depression/anxiety.

## 2024-11-01 DIAGNOSIS — F33.1 MODERATE EPISODE OF RECURRENT MAJOR DEPRESSIVE DISORDER: ICD-10-CM

## 2024-11-04 RX ORDER — BUSPIRONE HYDROCHLORIDE 7.5 MG/1
7.5 TABLET ORAL 3 TIMES DAILY
Qty: 270 TABLET | Refills: 1 | Status: SHIPPED | OUTPATIENT
Start: 2024-11-04

## 2024-11-04 NOTE — TELEPHONE ENCOUNTER
Rx Refill Note  Requested Prescriptions     Pending Prescriptions Disp Refills    busPIRone (BUSPAR) 7.5 MG tablet [Pharmacy Med Name: busPIRone HCl 7.5 MG Oral Tablet] 60 tablet 0     Sig: TAKE 1 TABLET BY MOUTH THREE TIMES DAILY      Last office visit with prescribing clinician: 9/27/2024   Last telemedicine visit with prescribing clinician: Visit date not found   Next office visit with prescribing clinician: Visit date not found   CPE done                       Would you like a call back once the refill request has been completed: [] Yes [] No    If the office needs to give you a call back, can they leave a voicemail: [] Yes [] No    Kelly Hidalgo MA  11/04/24, 08:26 CST

## 2024-11-20 ENCOUNTER — OFFICE VISIT (OUTPATIENT)
Dept: FAMILY MEDICINE CLINIC | Facility: CLINIC | Age: 39
End: 2024-11-20
Payer: COMMERCIAL

## 2024-11-20 VITALS
OXYGEN SATURATION: 98 % | DIASTOLIC BLOOD PRESSURE: 80 MMHG | WEIGHT: 293 LBS | HEIGHT: 66 IN | TEMPERATURE: 97.8 F | SYSTOLIC BLOOD PRESSURE: 114 MMHG | HEART RATE: 75 BPM | BODY MASS INDEX: 47.09 KG/M2

## 2024-11-20 DIAGNOSIS — F33.1 MODERATE EPISODE OF RECURRENT MAJOR DEPRESSIVE DISORDER: ICD-10-CM

## 2024-11-20 PROCEDURE — 99213 OFFICE O/P EST LOW 20 MIN: CPT | Performed by: NURSE PRACTITIONER

## 2024-11-20 PROCEDURE — 1126F AMNT PAIN NOTED NONE PRSNT: CPT | Performed by: NURSE PRACTITIONER

## 2024-11-20 PROCEDURE — 1159F MED LIST DOCD IN RCRD: CPT | Performed by: NURSE PRACTITIONER

## 2024-11-20 PROCEDURE — 1160F RVW MEDS BY RX/DR IN RCRD: CPT | Performed by: NURSE PRACTITIONER

## 2024-11-20 RX ORDER — BUSPIRONE HYDROCHLORIDE 15 MG/1
15 TABLET ORAL 3 TIMES DAILY
Status: CANCELLED | OUTPATIENT
Start: 2024-11-20

## 2024-11-20 NOTE — PROGRESS NOTES
CC: Anxiety    History:  Miley Villalba is a 38 y.o. female who presents today for evaluation of the above problems.    Patient reports that she is still not doing well. Continues to have severe anxiety and depression. Is currently taking buspirone 7.5 mg 2-3 times daily as well as celexa. Stopped vraylar due to weight gain.       HPI  ROS:  Review of Systems   Psychiatric/Behavioral:  Positive for dysphoric mood. The patient is nervous/anxious.        No Known Allergies  Past Medical History:   Diagnosis Date    Other sleep apnea      Past Surgical History:   Procedure Laterality Date    KNEE CARTILAGE SURGERY Right      Family History   Problem Relation Age of Onset    Diabetes Father     Alzheimer's disease Mother     Melanoma Maternal Grandfather     Diabetes Maternal Aunt     Diabetes Maternal Uncle     Diabetes Paternal Aunt     Diabetes Paternal Uncle       reports that she quit smoking about 10 years ago. Her smoking use included cigarettes. She started smoking about 23 years ago. She has a 13 pack-year smoking history. She has been exposed to tobacco smoke. She has never used smokeless tobacco. She reports current alcohol use. She reports that she does not use drugs.      Current Outpatient Medications:     busPIRone (BUSPAR) 7.5 MG tablet, TAKE 1 TABLET BY MOUTH THREE TIMES DAILY, Disp: 270 tablet, Rfl: 1    citalopram (CeleXA) 40 MG tablet, Take 1 tablet by mouth once daily, Disp: 90 tablet, Rfl: 2    lamoTRIgine (LaMICtal) 25 MG tablet, TAKE 1 TABLET BY MOUTH AT BEDTIME FOR  MOOD  STABILIZER, Disp: 90 tablet, Rfl: 1    MELATONIN PO, Take  by mouth., Disp: , Rfl:     omeprazole (priLOSEC) 40 MG capsule, Take 1 capsule by mouth once daily, Disp: 90 capsule, Rfl: 3    prazosin (MINIPRESS) 1 MG capsule, TAKE 1 CAPSULE BY MOUTH ONCE DAILY AT NIGHT, Disp: 90 capsule, Rfl: 0    OBJECTIVE:  /80 (BP Location: Left arm, Patient Position: Sitting, Cuff Size: Large Adult)   Pulse 75   Temp 97.8 °F (36.6 °C)  "(Temporal)   Ht 167.6 cm (66\")   Wt (!) 140 kg (309 lb 9.6 oz)   SpO2 98%   BMI 49.97 kg/m²    Physical Exam  Vitals reviewed.   Constitutional:       Appearance: She is well-developed.   Cardiovascular:      Rate and Rhythm: Normal rate.   Pulmonary:      Effort: Pulmonary effort is normal.   Neurological:      Mental Status: She is alert and oriented to person, place, and time.   Psychiatric:         Behavior: Behavior normal.         Assessment/Plan    Diagnoses and all orders for this visit:    1. Moderate episode of recurrent major depressive disorder  -     [facility] - Swab,; Future    Increase buspirone to 15 mg up to 3x daily. [facility] swab to evaluate maintenance medication.     An After Visit Summary was printed and given to the patient at discharge.  Return if symptoms worsen or fail to improve, for Next scheduled follow up.       ARYA Jacques 11/20/24    Electronically signed.  "

## 2024-11-27 DIAGNOSIS — R45.86 MOOD DISTURBANCE: Primary | ICD-10-CM

## 2024-11-27 RX ORDER — OXCARBAZEPINE 150 MG/1
150 TABLET, FILM COATED ORAL 2 TIMES DAILY
Qty: 60 TABLET | Refills: 0 | Status: SHIPPED | OUTPATIENT
Start: 2024-11-27

## 2024-12-01 DIAGNOSIS — R42 DIZZINESS: ICD-10-CM

## 2024-12-02 ENCOUNTER — TELEPHONE (OUTPATIENT)
Dept: FAMILY MEDICINE CLINIC | Facility: CLINIC | Age: 39
End: 2024-12-02
Payer: COMMERCIAL

## 2024-12-02 RX ORDER — PRAZOSIN HYDROCHLORIDE 1 MG/1
1 CAPSULE ORAL NIGHTLY
Qty: 90 CAPSULE | Refills: 2 | Status: SHIPPED | OUTPATIENT
Start: 2024-12-02

## 2024-12-02 NOTE — TELEPHONE ENCOUNTER
Shivani Miller, Kelly Justin MA  Patient needs to stop lamictal and change to trileptal 300 mg twice a day for mood stabilizer.  Her lamictal interacts with her genes significantly.  Follow up in 2 weeks and we will need to change her celexa as well. It also interacts, but I can't change it all at once. She needs to keep taking the celexa in the mean time. She isn't allergic to it or anything, it just won't be as effective for her.

## 2024-12-02 NOTE — TELEPHONE ENCOUNTER
Rx Refill Note  Requested Prescriptions     Pending Prescriptions Disp Refills    prazosin (MINIPRESS) 1 MG capsule [Pharmacy Med Name: Prazosin HCl 1 MG Oral Capsule] 90 capsule 0     Sig: TAKE 1 CAPSULE BY MOUTH ONCE DAILY AT NIGHT      Last office visit with prescribing clinician: 11/20/24   Last telemedicine visit with prescribing clinician: Visit date not found   Next office visit with prescribing clinician: 12/19/2024     Kelly Mcelroy MA  12/02/24, 08:00 CST

## 2024-12-03 ENCOUNTER — OFFICE VISIT (OUTPATIENT)
Dept: FAMILY MEDICINE CLINIC | Facility: CLINIC | Age: 39
End: 2024-12-03
Payer: COMMERCIAL

## 2024-12-03 VITALS
SYSTOLIC BLOOD PRESSURE: 127 MMHG | TEMPERATURE: 97.8 F | WEIGHT: 293 LBS | HEIGHT: 66 IN | DIASTOLIC BLOOD PRESSURE: 86 MMHG | OXYGEN SATURATION: 99 % | HEART RATE: 74 BPM | BODY MASS INDEX: 47.09 KG/M2

## 2024-12-03 DIAGNOSIS — N89.8 VAGINAL ODOR: Primary | ICD-10-CM

## 2024-12-03 DIAGNOSIS — B96.89 BV (BACTERIAL VAGINOSIS): ICD-10-CM

## 2024-12-03 DIAGNOSIS — N76.0 BV (BACTERIAL VAGINOSIS): ICD-10-CM

## 2024-12-03 PROCEDURE — 1160F RVW MEDS BY RX/DR IN RCRD: CPT | Performed by: NURSE PRACTITIONER

## 2024-12-03 PROCEDURE — 1159F MED LIST DOCD IN RCRD: CPT | Performed by: NURSE PRACTITIONER

## 2024-12-03 PROCEDURE — 1126F AMNT PAIN NOTED NONE PRSNT: CPT | Performed by: NURSE PRACTITIONER

## 2024-12-03 PROCEDURE — 99213 OFFICE O/P EST LOW 20 MIN: CPT | Performed by: NURSE PRACTITIONER

## 2024-12-09 RX ORDER — METRONIDAZOLE 500 MG/1
500 TABLET ORAL 2 TIMES DAILY
Qty: 14 TABLET | Refills: 0 | Status: SHIPPED | OUTPATIENT
Start: 2024-12-09 | End: 2024-12-16

## 2024-12-09 RX ORDER — AZITHROMYCIN 500 MG/1
1000 TABLET, FILM COATED ORAL ONCE
Qty: 2 TABLET | Refills: 0 | Status: SHIPPED | OUTPATIENT
Start: 2024-12-09 | End: 2024-12-09

## 2024-12-19 ENCOUNTER — OFFICE VISIT (OUTPATIENT)
Dept: FAMILY MEDICINE CLINIC | Facility: CLINIC | Age: 39
End: 2024-12-19
Payer: COMMERCIAL

## 2024-12-19 VITALS
WEIGHT: 293 LBS | DIASTOLIC BLOOD PRESSURE: 87 MMHG | HEART RATE: 75 BPM | BODY MASS INDEX: 47.09 KG/M2 | OXYGEN SATURATION: 98 % | SYSTOLIC BLOOD PRESSURE: 135 MMHG | TEMPERATURE: 98 F | HEIGHT: 66 IN

## 2024-12-19 DIAGNOSIS — E66.813 CLASS 3 SEVERE OBESITY DUE TO EXCESS CALORIES WITH SERIOUS COMORBIDITY AND BODY MASS INDEX (BMI) OF 45.0 TO 49.9 IN ADULT: Primary | ICD-10-CM

## 2024-12-19 DIAGNOSIS — F32.1 CURRENT MODERATE EPISODE OF MAJOR DEPRESSIVE DISORDER WITHOUT PRIOR EPISODE: ICD-10-CM

## 2024-12-19 DIAGNOSIS — E66.01 CLASS 3 SEVERE OBESITY DUE TO EXCESS CALORIES WITH SERIOUS COMORBIDITY AND BODY MASS INDEX (BMI) OF 45.0 TO 49.9 IN ADULT: Primary | ICD-10-CM

## 2024-12-19 PROCEDURE — 1126F AMNT PAIN NOTED NONE PRSNT: CPT | Performed by: NURSE PRACTITIONER

## 2024-12-19 PROCEDURE — 99214 OFFICE O/P EST MOD 30 MIN: CPT | Performed by: NURSE PRACTITIONER

## 2024-12-19 PROCEDURE — 1160F RVW MEDS BY RX/DR IN RCRD: CPT | Performed by: NURSE PRACTITIONER

## 2024-12-19 PROCEDURE — 1159F MED LIST DOCD IN RCRD: CPT | Performed by: NURSE PRACTITIONER

## 2024-12-19 NOTE — PROGRESS NOTES
CC: 6 month check - obesity and depression    History:  Miley Villalba is a 39 y.o. female who presents today for evaluation of the above problems.      Patient presents for 6-month check on obesity and depression.  Her weight is down 7 pounds since her last visit.  Does feel that her new medication for her depression is working well.  He is currently on Trileptal and Celexa.  This combination was decided on after reviewing her [employer] results.  Unfortunately she has had a very rough week with multiple emotionally traumatic events and she states that she has actually handled all of them very well and feels like the medication is working adequately for her.     HPI  ROS:  Review of Systems   Psychiatric/Behavioral:  Positive for dysphoric mood. The patient is nervous/anxious.        No Known Allergies  Past Medical History:   Diagnosis Date    Other sleep apnea      Past Surgical History:   Procedure Laterality Date    KNEE CARTILAGE SURGERY Right      Family History   Problem Relation Age of Onset    Diabetes Father     Alzheimer's disease Mother     Melanoma Maternal Grandfather     Diabetes Maternal Aunt     Diabetes Maternal Uncle     Diabetes Paternal Aunt     Diabetes Paternal Uncle       reports that she quit smoking about 10 years ago. Her smoking use included cigarettes. She started smoking about 23 years ago. She has a 13 pack-year smoking history. She has been exposed to tobacco smoke. She has never used smokeless tobacco. She reports current alcohol use. She reports that she does not use drugs.      Current Outpatient Medications:     busPIRone (BUSPAR) 7.5 MG tablet, TAKE 1 TABLET BY MOUTH THREE TIMES DAILY, Disp: 270 tablet, Rfl: 1    citalopram (CeleXA) 40 MG tablet, Take 1 tablet by mouth once daily, Disp: 90 tablet, Rfl: 2    MELATONIN PO, Take  by mouth., Disp: , Rfl:     omeprazole (priLOSEC) 40 MG capsule, Take 1 capsule by mouth once daily, Disp: 90 capsule, Rfl: 3    OXcarbazepine (Trileptal)  "150 MG tablet, Take 1 tablet by mouth 2 (Two) Times a Day., Disp: 60 tablet, Rfl: 0    prazosin (MINIPRESS) 1 MG capsule, TAKE 1 CAPSULE BY MOUTH ONCE DAILY AT NIGHT, Disp: 90 capsule, Rfl: 2    OBJECTIVE:  /87 (BP Location: Left arm, Patient Position: Sitting, Cuff Size: Large Adult)   Pulse 75   Temp 98 °F (36.7 °C) (Temporal)   Ht 167.6 cm (66\")   Wt (!) 138 kg (304 lb 9.6 oz)   SpO2 98%   BMI 49.16 kg/m²    Physical Exam  Vitals reviewed.   Constitutional:       Appearance: She is well-developed.   Cardiovascular:      Rate and Rhythm: Normal rate and regular rhythm.      Heart sounds: Normal heart sounds.   Pulmonary:      Effort: Pulmonary effort is normal.      Breath sounds: Normal breath sounds.   Neurological:      Mental Status: She is alert and oriented to person, place, and time.   Psychiatric:         Behavior: Behavior normal.         Assessment/Plan    Diagnoses and all orders for this visit:    1. Class 3 severe obesity due to excess calories with serious comorbidity and body mass index (BMI) of 45.0 to 49.9 in adult (Primary)  -     Comprehensive Metabolic Panel  -     Lipid Panel    2. Current moderate episode of major depressive disorder without prior episode  -     Comprehensive Metabolic Panel  -     Lipid Panel    Labs today.  Continue Trileptal and Celexa at current doses.    An After Visit Summary was printed and given to the patient at discharge.  Return in about 6 months (around 6/19/2025) for Next scheduled follow up.       Shivani KOENIG 12/19/2024    Electronically signed.  "

## 2024-12-20 LAB
ALBUMIN SERPL-MCNC: 4 G/DL (ref 3.5–5.2)
ALBUMIN/GLOB SERPL: 1.2 G/DL
ALP SERPL-CCNC: 125 U/L (ref 39–117)
ALT SERPL-CCNC: 16 U/L (ref 1–33)
AST SERPL-CCNC: 18 U/L (ref 1–32)
BILIRUB SERPL-MCNC: <0.2 MG/DL (ref 0–1.2)
BUN SERPL-MCNC: 9 MG/DL (ref 6–20)
BUN/CREAT SERPL: 13.6 (ref 7–25)
CALCIUM SERPL-MCNC: 9.2 MG/DL (ref 8.6–10.5)
CHLORIDE SERPL-SCNC: 101 MMOL/L (ref 98–107)
CHOLEST SERPL-MCNC: 184 MG/DL (ref 0–200)
CO2 SERPL-SCNC: 26.5 MMOL/L (ref 22–29)
CREAT SERPL-MCNC: 0.66 MG/DL (ref 0.57–1)
EGFRCR SERPLBLD CKD-EPI 2021: 116 ML/MIN/1.73
GLOBULIN SER CALC-MCNC: 3.4 GM/DL
GLUCOSE SERPL-MCNC: 96 MG/DL (ref 65–99)
HDLC SERPL-MCNC: 46 MG/DL (ref 40–60)
LDLC SERPL CALC-MCNC: 98 MG/DL (ref 0–100)
POTASSIUM SERPL-SCNC: 4.1 MMOL/L (ref 3.5–5.2)
PROT SERPL-MCNC: 7.4 G/DL (ref 6–8.5)
SODIUM SERPL-SCNC: 139 MMOL/L (ref 136–145)
TRIGL SERPL-MCNC: 234 MG/DL (ref 0–150)
VLDLC SERPL CALC-MCNC: 40 MG/DL (ref 5–40)

## 2024-12-29 DIAGNOSIS — R45.86 MOOD DISTURBANCE: ICD-10-CM

## 2024-12-30 RX ORDER — OXCARBAZEPINE 150 MG/1
150 TABLET, FILM COATED ORAL 2 TIMES DAILY
Qty: 180 TABLET | Refills: 1 | Status: SHIPPED | OUTPATIENT
Start: 2024-12-30

## 2024-12-30 NOTE — TELEPHONE ENCOUNTER
Rx Refill Note  Requested Prescriptions     Pending Prescriptions Disp Refills    OXcarbazepine (TRILEPTAL) 150 MG tablet [Pharmacy Med Name: OXcarbazepine 150 MG Oral Tablet] 60 tablet 0     Sig: Take 1 tablet by mouth twice daily      Last office visit with prescribing clinician: 12/19/2024   Last telemedicine visit with prescribing clinician: Visit date not found   Next office visit with prescribing clinician:     Kelly Mcelroy MA  12/30/24, 07:52 CST

## 2025-01-23 RX ORDER — CITALOPRAM HYDROBROMIDE 40 MG/1
40 TABLET ORAL DAILY
Qty: 90 TABLET | Refills: 1 | Status: SHIPPED | OUTPATIENT
Start: 2025-01-23

## 2025-01-23 NOTE — TELEPHONE ENCOUNTER
Rx Refill Note  Requested Prescriptions     Pending Prescriptions Disp Refills    citalopram (CeleXA) 40 MG tablet [Pharmacy Med Name: Citalopram Hydrobromide 40 MG Oral Tablet] 90 tablet 0     Sig: Take 1 tablet by mouth once daily      Last office visit with prescribing clinician: 12/19/2024   Last telemedicine visit with prescribing clinician: Visit date not found   Next office visit with prescribing clinician:     Kelly Mcelroy MA  01/23/25, 07:29 CST

## 2025-02-27 ENCOUNTER — OFFICE VISIT (OUTPATIENT)
Dept: FAMILY MEDICINE CLINIC | Facility: CLINIC | Age: 40
End: 2025-02-27
Payer: COMMERCIAL

## 2025-02-27 VITALS
HEART RATE: 71 BPM | SYSTOLIC BLOOD PRESSURE: 127 MMHG | BODY MASS INDEX: 47.09 KG/M2 | HEIGHT: 66 IN | DIASTOLIC BLOOD PRESSURE: 84 MMHG | WEIGHT: 293 LBS | TEMPERATURE: 97.3 F | OXYGEN SATURATION: 97 %

## 2025-02-27 DIAGNOSIS — N89.8 VAGINAL ODOR: Primary | ICD-10-CM

## 2025-02-27 PROCEDURE — 1160F RVW MEDS BY RX/DR IN RCRD: CPT | Performed by: NURSE PRACTITIONER

## 2025-02-27 PROCEDURE — 99213 OFFICE O/P EST LOW 20 MIN: CPT | Performed by: NURSE PRACTITIONER

## 2025-02-27 PROCEDURE — 1159F MED LIST DOCD IN RCRD: CPT | Performed by: NURSE PRACTITIONER

## 2025-02-27 PROCEDURE — 1126F AMNT PAIN NOTED NONE PRSNT: CPT | Performed by: NURSE PRACTITIONER

## 2025-02-27 NOTE — PROGRESS NOTES
CC:   Chief Complaint   Patient presents with    Vaginal Discharge     Follow up after finishing medication for BV.  States she still has an odor        History:  Miley Villalba is a 39 y.o. female who presents today for evaluation of the above problems.      HPI  Patient presents with complaint of persistent abnormal vaginal odor.  Patient was seen in December for abnormal vaginal discharge and odor-[facility] swab was positive for bacteria consistent with BV.  Patient took antibiotics and states symptoms did improve, however she continues to have an abnormal odor.  Patient only uses Vagisil wash. States she is sexually active with a female partner. It is a new partner since her visit in December. Afebrile. No vaginal pain.      No Known Allergies  Past Medical History:   Diagnosis Date    Other sleep apnea      Past Surgical History:   Procedure Laterality Date    KNEE CARTILAGE SURGERY Right      Family History   Problem Relation Age of Onset    Diabetes Father     Alzheimer's disease Mother     Melanoma Maternal Grandfather     Diabetes Maternal Aunt     Diabetes Maternal Uncle     Diabetes Paternal Aunt     Diabetes Paternal Uncle       reports that she quit smoking about 11 years ago. Her smoking use included cigarettes. She started smoking about 24 years ago. She has a 13 pack-year smoking history. She has been exposed to tobacco smoke. She has never used smokeless tobacco. She reports current alcohol use. She reports that she does not use drugs.      Current Outpatient Medications:     busPIRone (BUSPAR) 7.5 MG tablet, TAKE 1 TABLET BY MOUTH THREE TIMES DAILY, Disp: 270 tablet, Rfl: 1    citalopram (CeleXA) 40 MG tablet, Take 1 tablet by mouth once daily, Disp: 90 tablet, Rfl: 1    omeprazole (priLOSEC) 40 MG capsule, Take 1 capsule by mouth once daily (Patient taking differently: Take 1 capsule by mouth As Needed (heartburn).), Disp: 90 capsule, Rfl: 3    OXcarbazepine (TRILEPTAL) 150 MG tablet, Take 1  "tablet by mouth twice daily, Disp: 180 tablet, Rfl: 1    prazosin (MINIPRESS) 1 MG capsule, TAKE 1 CAPSULE BY MOUTH ONCE DAILY AT NIGHT, Disp: 90 capsule, Rfl: 2    OBJECTIVE:  /84 (BP Location: Left arm, Patient Position: Sitting, Cuff Size: Large Adult)   Pulse 71   Temp 97.3 °F (36.3 °C) (Temporal)   Ht 167.6 cm (66\")   Wt (!) 138 kg (304 lb 3.2 oz)   SpO2 97%   BMI 49.10 kg/m²    Estimated body mass index is 49.1 kg/m² as calculated from the following:    Height as of this encounter: 167.6 cm (66\").    Weight as of this encounter: 138 kg (304 lb 3.2 oz).                  Physical Exam  Vitals reviewed.   Constitutional:       General: She is not in acute distress.     Appearance: Normal appearance.   HENT:      Head: Normocephalic and atraumatic.   Cardiovascular:      Rate and Rhythm: Normal rate and regular rhythm.      Heart sounds: Normal heart sounds.   Pulmonary:      Effort: No respiratory distress.      Breath sounds: Normal breath sounds. No wheezing.   Abdominal:      General: Bowel sounds are normal.      Palpations: Abdomen is soft.      Tenderness: There is no abdominal tenderness.   Musculoskeletal:         General: Normal range of motion.   Skin:     General: Skin is warm and dry.   Neurological:      Mental Status: She is alert and oriented to person, place, and time.   Psychiatric:         Mood and Affect: Mood normal.         Behavior: Behavior normal.              Assessment/Plan    Diagnoses and all orders for this visit:    1. Vaginal odor (Primary)  -     [facility] Miscellaneous - , Vagina; Future    Will repeat [facility] swab. Will call patient with results and treatment plan             An After Visit Summary was printed and given to the patient at discharge.  Return if symptoms worsen or fail to improve.           "

## 2025-03-03 RX ORDER — METRONIDAZOLE 500 MG/1
500 TABLET ORAL 2 TIMES DAILY
Qty: 14 TABLET | Refills: 0 | Status: SHIPPED | OUTPATIENT
Start: 2025-03-03 | End: 2025-03-10

## 2025-03-03 RX ORDER — AZITHROMYCIN 500 MG/1
1000 TABLET, FILM COATED ORAL ONCE
Qty: 2 TABLET | Refills: 0 | Status: SHIPPED | OUTPATIENT
Start: 2025-03-03 | End: 2025-03-03

## 2025-05-04 DIAGNOSIS — F33.1 MODERATE EPISODE OF RECURRENT MAJOR DEPRESSIVE DISORDER: ICD-10-CM

## 2025-05-05 RX ORDER — BUSPIRONE HYDROCHLORIDE 7.5 MG/1
7.5 TABLET ORAL 3 TIMES DAILY
Qty: 270 TABLET | Refills: 0 | Status: SHIPPED | OUTPATIENT
Start: 2025-05-05

## 2025-05-05 NOTE — TELEPHONE ENCOUNTER
Rx Refill Note  Requested Prescriptions     Pending Prescriptions Disp Refills    busPIRone (BUSPAR) 7.5 MG tablet [Pharmacy Med Name: busPIRone HCl 7.5 MG Oral Tablet] 270 tablet 0     Sig: TAKE 1 TABLET BY MOUTH THREE TIMES DAILY      Last office visit with prescribing clinician: 2/27/25   Last telemedicine visit with prescribing clinician: Visit date not found   Next office visit with prescribing clinician:       Kelly Mcelroy MA  05/05/25, 07:37 CDT

## 2025-05-14 ENCOUNTER — OFFICE VISIT (OUTPATIENT)
Dept: FAMILY MEDICINE CLINIC | Facility: CLINIC | Age: 40
End: 2025-05-14
Payer: COMMERCIAL

## 2025-05-14 VITALS
WEIGHT: 293 LBS | OXYGEN SATURATION: 98 % | HEART RATE: 71 BPM | BODY MASS INDEX: 47.09 KG/M2 | HEIGHT: 66 IN | DIASTOLIC BLOOD PRESSURE: 73 MMHG | TEMPERATURE: 98.2 F | SYSTOLIC BLOOD PRESSURE: 114 MMHG

## 2025-05-14 DIAGNOSIS — F33.1 MODERATE EPISODE OF RECURRENT MAJOR DEPRESSIVE DISORDER: ICD-10-CM

## 2025-05-14 DIAGNOSIS — G47.33 OBSTRUCTIVE SLEEP APNEA SYNDROME: Primary | ICD-10-CM

## 2025-05-14 PROBLEM — F32.1 CURRENT MODERATE EPISODE OF MAJOR DEPRESSIVE DISORDER WITHOUT PRIOR EPISODE: Status: RESOLVED | Noted: 2024-09-27 | Resolved: 2025-05-14

## 2025-05-14 PROCEDURE — 1126F AMNT PAIN NOTED NONE PRSNT: CPT | Performed by: NURSE PRACTITIONER

## 2025-05-14 PROCEDURE — 99214 OFFICE O/P EST MOD 30 MIN: CPT | Performed by: NURSE PRACTITIONER

## 2025-05-14 NOTE — PROGRESS NOTES
CC: sleep apnea; depression mgmt.     History:  Miley Villalba is a 39 y.o. female who presents today for evaluation of the above problems.      Patient presents for sleep apnea and depression mgmt. She notes that she is compliant with her CPAP. If she does fall asleep without it she wakes up gasping for breath. She states that she is in need of new supplies and uses [facility].     Also, she feels that she is in a good place emotionally and would like to come off of her celexa. She has been on it for several years.       HPI  ROS:  Review of Systems   Psychiatric/Behavioral:  Negative for agitation and dysphoric mood. The patient is not nervous/anxious.        No Known Allergies  Past Medical History:   Diagnosis Date    Other sleep apnea      Past Surgical History:   Procedure Laterality Date    KNEE CARTILAGE SURGERY Right      Family History   Problem Relation Age of Onset    Diabetes Father     Alzheimer's disease Mother     Melanoma Maternal Grandfather     Diabetes Maternal Aunt     Diabetes Maternal Uncle     Diabetes Paternal Aunt     Diabetes Paternal Uncle       reports that she quit smoking about 11 years ago. Her smoking use included cigarettes. She started smoking about 24 years ago. She has a 13 pack-year smoking history. She has been exposed to tobacco smoke. She has never used smokeless tobacco. She reports current alcohol use. She reports that she does not use drugs.      Current Outpatient Medications:     busPIRone (BUSPAR) 7.5 MG tablet, TAKE 1 TABLET BY MOUTH THREE TIMES DAILY, Disp: 270 tablet, Rfl: 0    citalopram (CeleXA) 40 MG tablet, Take 1 tablet by mouth once daily, Disp: 90 tablet, Rfl: 1    omeprazole (priLOSEC) 40 MG capsule, Take 1 capsule by mouth once daily (Patient taking differently: Take 1 capsule by mouth As Needed (heartburn).), Disp: 90 capsule, Rfl: 3    OXcarbazepine (TRILEPTAL) 150 MG tablet, Take 1 tablet by mouth twice daily, Disp: 180 tablet, Rfl: 1    prazosin  "(MINIPRESS) 1 MG capsule, TAKE 1 CAPSULE BY MOUTH ONCE DAILY AT NIGHT, Disp: 90 capsule, Rfl: 2    OBJECTIVE:  /73 (BP Location: Left arm, Patient Position: Sitting, Cuff Size: Large Adult)   Pulse 71   Temp 98.2 °F (36.8 °C) (Temporal)   Ht 167.6 cm (66\")   Wt 134 kg (295 lb 9.6 oz)   SpO2 98%   BMI 47.71 kg/m²    Physical Exam  Vitals reviewed.   Constitutional:       Appearance: She is well-developed.   Cardiovascular:      Rate and Rhythm: Normal rate.   Pulmonary:      Effort: Pulmonary effort is normal.   Neurological:      Mental Status: She is alert and oriented to person, place, and time.   Psychiatric:         Behavior: Behavior normal.         Assessment/Plan    Diagnoses and all orders for this visit:    1. Obstructive sleep apnea syndrome (Primary)  -     Generic CPAP Order    2. Moderate episode of recurrent major depressive disorder    Patient advised to take 1/2 tablet of celexa for one month, then contact office and request script for 10 mg tablets. At that time, she can take 10 mg for a month and then 1/2 those tablets and continue to wean as tolerated.       CPAP supply order will be sent to [facility].       An After Visit Summary was printed and given to the patient at discharge.  Return if symptoms worsen or fail to improve, for Next scheduled follow up.       Shivani Miller, ARYA 5/14/25    Electronically signed.  "

## 2025-05-19 ENCOUNTER — TELEPHONE (OUTPATIENT)
Dept: FAMILY MEDICINE CLINIC | Facility: CLINIC | Age: 40
End: 2025-05-19
Payer: COMMERCIAL

## 2025-05-19 NOTE — TELEPHONE ENCOUNTER
"Pt calls today after being seen on 5/15. At the OV pt and provider discussed cutting Celexa dose down to 20 mg. Pt has been on the 20 mg dose for about 5 days. Today she has noticed her BP running high( recorded BP of 140/90) with \"fuzzy headed\" feeling and having a flushed face. Pt denies any other symptoms and denies any other changes in medication or environment that would effect BP.  Informed pt that we would discuss with provider and get back with her.   "

## 2025-05-19 NOTE — TELEPHONE ENCOUNTER
Shouldn't be related to reducing the medicine unless she is feeling more stressed or anxious causing BP to go up. I would recommend that she try taking her buspirone to see if that helps.

## 2025-05-20 DIAGNOSIS — R45.86 MOOD DISTURBANCE: ICD-10-CM

## 2025-05-20 RX ORDER — OXCARBAZEPINE 150 MG/1
150 TABLET, FILM COATED ORAL 2 TIMES DAILY
Qty: 180 TABLET | Refills: 0 | Status: SHIPPED | OUTPATIENT
Start: 2025-05-20

## 2025-05-20 NOTE — TELEPHONE ENCOUNTER
Rx Refill Note  Requested Prescriptions     Pending Prescriptions Disp Refills    OXcarbazepine (TRILEPTAL) 150 MG tablet [Pharmacy Med Name: OXcarbazepine 150 MG Oral Tablet] 180 tablet 0     Sig: Take 1 tablet by mouth twice daily      Last office visit with prescribing clinician: 5/14/2025   Last telemedicine visit with prescribing clinician: Visit date not found   Next office visit with prescribing clinician: Visit date not found   CPE done 6/19/2024                  {TIP  Is Refill Pharmacy correct?: yes    Would you like a call back once the refill request has been completed: [] Yes [] No    If the office needs to give you a call back, can they leave a voicemail: [] Yes [] No    Kelly Hidalgo MA  05/20/25, 14:51 CDT

## 2025-06-23 ENCOUNTER — OFFICE VISIT (OUTPATIENT)
Dept: FAMILY MEDICINE CLINIC | Facility: CLINIC | Age: 40
End: 2025-06-23
Payer: COMMERCIAL

## 2025-06-23 VITALS
HEIGHT: 66 IN | SYSTOLIC BLOOD PRESSURE: 131 MMHG | OXYGEN SATURATION: 98 % | HEART RATE: 80 BPM | TEMPERATURE: 98.4 F | BODY MASS INDEX: 47.09 KG/M2 | WEIGHT: 293 LBS | DIASTOLIC BLOOD PRESSURE: 83 MMHG

## 2025-06-23 DIAGNOSIS — L30.1 DYSHIDROTIC ECZEMA: Primary | ICD-10-CM

## 2025-06-23 PROCEDURE — 99213 OFFICE O/P EST LOW 20 MIN: CPT | Performed by: NURSE PRACTITIONER

## 2025-06-23 PROCEDURE — 1126F AMNT PAIN NOTED NONE PRSNT: CPT | Performed by: NURSE PRACTITIONER

## 2025-06-23 RX ORDER — CLOBETASOL PROPIONATE 0.5 MG/G
1 OINTMENT TOPICAL 2 TIMES DAILY
Qty: 60 G | Refills: 1 | Status: SHIPPED | OUTPATIENT
Start: 2025-06-23

## 2025-06-24 NOTE — PROGRESS NOTES
"CC: rash    History:  Miley Villalba is a 39 y.o. female who presents today for evaluation of the above problems.      Patient complains of waxing and waning rash present on her hands for several years. States that it starts with small blisters and then when those rupture she will develop painful cracks.     Rash    ROS:  Review of Systems   Skin:  Positive for rash.       No Known Allergies  Past Medical History:   Diagnosis Date    Other sleep apnea      Past Surgical History:   Procedure Laterality Date    KNEE CARTILAGE SURGERY Right      Family History   Problem Relation Age of Onset    Diabetes Father     Alzheimer's disease Mother     Melanoma Maternal Grandfather     Diabetes Maternal Aunt     Diabetes Maternal Uncle     Diabetes Paternal Aunt     Diabetes Paternal Uncle       reports that she quit smoking about 11 years ago. Her smoking use included cigarettes. She started smoking about 24 years ago. She has a 13 pack-year smoking history. She has been exposed to tobacco smoke. She has never used smokeless tobacco. She reports current alcohol use. She reports that she does not use drugs.      Current Outpatient Medications:     busPIRone (BUSPAR) 7.5 MG tablet, TAKE 1 TABLET BY MOUTH THREE TIMES DAILY, Disp: 270 tablet, Rfl: 0    citalopram (CeleXA) 40 MG tablet, Take 1 tablet by mouth once daily (Patient taking differently: Patient is tapering dose to stop), Disp: 90 tablet, Rfl: 1    OXcarbazepine (TRILEPTAL) 150 MG tablet, Take 1 tablet by mouth twice daily, Disp: 180 tablet, Rfl: 0    prazosin (MINIPRESS) 1 MG capsule, TAKE 1 CAPSULE BY MOUTH ONCE DAILY AT NIGHT, Disp: 90 capsule, Rfl: 2    clobetasol (TEMOVATE) 0.05 % ointment, Apply 1 Application topically to the appropriate area as directed 2 (Two) Times a Day., Disp: 60 g, Rfl: 1    OBJECTIVE:  /83 (BP Location: Right arm, Patient Position: Sitting, Cuff Size: Large Adult)   Pulse 80   Temp 98.4 °F (36.9 °C) (Temporal)   Ht 167.6 cm (66\")  "  Wt (!) 137 kg (302 lb 3.2 oz)   SpO2 98%   BMI 48.78 kg/m²    Physical Exam  Vitals reviewed.   Constitutional:       Appearance: She is well-developed.   Cardiovascular:      Rate and Rhythm: Normal rate.   Pulmonary:      Effort: Pulmonary effort is normal.   Skin:     Comments: Dry and peeling skin around edges of palms and on sides of some fingers. No current blistering noted.   Neurological:      Mental Status: She is alert and oriented to person, place, and time.   Psychiatric:         Behavior: Behavior normal.         Assessment/Plan    Diagnoses and all orders for this visit:    1. Dyshidrotic eczema (Primary)  -     clobetasol (TEMOVATE) 0.05 % ointment; Apply 1 Application topically to the appropriate area as directed 2 (Two) Times a Day.  Dispense: 60 g; Refill: 1        An After Visit Summary was printed and given to the patient at discharge.  Return if symptoms worsen or fail to improve, for Next scheduled follow up.       ARYA Jacques 6/24/25    Electronically signed.

## 2025-07-10 ENCOUNTER — OFFICE VISIT (OUTPATIENT)
Dept: FAMILY MEDICINE CLINIC | Facility: CLINIC | Age: 40
End: 2025-07-10
Payer: COMMERCIAL

## 2025-07-10 VITALS
WEIGHT: 293 LBS | DIASTOLIC BLOOD PRESSURE: 76 MMHG | OXYGEN SATURATION: 98 % | HEIGHT: 66 IN | HEART RATE: 68 BPM | TEMPERATURE: 97.8 F | BODY MASS INDEX: 47.09 KG/M2 | SYSTOLIC BLOOD PRESSURE: 112 MMHG

## 2025-07-10 DIAGNOSIS — G43.009 MIGRAINE WITHOUT AURA AND WITHOUT STATUS MIGRAINOSUS, NOT INTRACTABLE: Primary | ICD-10-CM

## 2025-07-10 DIAGNOSIS — R23.8 SKIN IRRITATION: ICD-10-CM

## 2025-07-10 PROCEDURE — 99214 OFFICE O/P EST MOD 30 MIN: CPT | Performed by: NURSE PRACTITIONER

## 2025-07-10 PROCEDURE — 1125F AMNT PAIN NOTED PAIN PRSNT: CPT | Performed by: NURSE PRACTITIONER

## 2025-07-10 NOTE — PROGRESS NOTES
CC: migraines and skin irritation    History:  Miley Villalba is a 39 y.o. female who presents today for evaluation of the above problems.      Increasing incidence of migraines for the past 8 months .  Now having 1 every 2 weeks. Has taken excedrin today - 5 tablets.   Has taken triptans in the past for headaches and these have helped. Does not want to take a daily prophylactic.     Also, she continues to have irritation and peeling on the edges of her palms that is very painful. She has been using clobetasol and Working Man Hands lotion. These have not helped.     HPI  ROS:  Review of Systems   Eyes:  Positive for photophobia. Negative for visual disturbance.   Neurological:  Positive for headaches. Negative for tremors, weakness and numbness.       No Known Allergies  Past Medical History:   Diagnosis Date    Other sleep apnea      Past Surgical History:   Procedure Laterality Date    KNEE CARTILAGE SURGERY Right      Family History   Problem Relation Age of Onset    Diabetes Father     Alzheimer's disease Mother     Melanoma Maternal Grandfather     Diabetes Maternal Aunt     Diabetes Maternal Uncle     Diabetes Paternal Aunt     Diabetes Paternal Uncle       reports that she quit smoking about 11 years ago. Her smoking use included cigarettes. She started smoking about 24 years ago. She has a 13 pack-year smoking history. She has been exposed to tobacco smoke. She has never used smokeless tobacco. She reports current alcohol use. She reports that she does not use drugs.      Current Outpatient Medications:     busPIRone (BUSPAR) 7.5 MG tablet, TAKE 1 TABLET BY MOUTH THREE TIMES DAILY, Disp: 270 tablet, Rfl: 0    citalopram (CeleXA) 40 MG tablet, Take 1 tablet by mouth once daily (Patient taking differently: Patient is tapering dose to stop), Disp: 90 tablet, Rfl: 1    clobetasol (TEMOVATE) 0.05 % ointment, Apply 1 Application topically to the appropriate area as directed 2 (Two) Times a Day., Disp: 60 g, Rfl:  "1    OXcarbazepine (TRILEPTAL) 150 MG tablet, Take 1 tablet by mouth twice daily, Disp: 180 tablet, Rfl: 0    prazosin (MINIPRESS) 1 MG capsule, TAKE 1 CAPSULE BY MOUTH ONCE DAILY AT NIGHT, Disp: 90 capsule, Rfl: 2    rimegepant sulfate ODT (Nurtec) 75 MG disintegrating tablet, Place 1 tablet under the tongue 1 (One) Time for 1 dose., Disp: 8 tablet, Rfl: 0    OBJECTIVE:  /76 (BP Location: Left arm, Patient Position: Sitting, Cuff Size: Large Adult)   Pulse 68   Temp 97.8 °F (36.6 °C) (Temporal)   Ht 167.6 cm (66\")   Wt (!) 137 kg (302 lb)   SpO2 98%   BMI 48.74 kg/m²    Physical Exam  Vitals reviewed.   Constitutional:       Appearance: She is well-developed.   Eyes:      Pupils: Pupils are equal, round, and reactive to light.   Cardiovascular:      Rate and Rhythm: Normal rate.   Pulmonary:      Effort: Pulmonary effort is normal.   Skin:     Comments: The outside edges of palms are red and peeling.    Neurological:      Mental Status: She is alert and oriented to person, place, and time.   Psychiatric:         Behavior: Behavior normal.         Assessment/Plan    Diagnoses and all orders for this visit:    1. Migraine without aura and without status migrainosus, not intractable (Primary)  -     rimegepant sulfate ODT (Nurtec) 75 MG disintegrating tablet; Place 1 tablet under the tongue 1 (One) Time for 1 dose.  Dispense: 8 tablet; Refill: 0    2. Skin irritation  -     Ambulatory Referral to Dermatology        An After Visit Summary was printed and given to the patient at discharge.  Return in about 1 month (around 8/10/2025) for Annual physical.       ARYA Jacques 7/10/25    Electronically signed.  "

## 2025-08-01 ENCOUNTER — OFFICE VISIT (OUTPATIENT)
Dept: OBSTETRICS AND GYNECOLOGY | Age: 40
End: 2025-08-01
Payer: COMMERCIAL

## 2025-08-01 VITALS
HEIGHT: 66 IN | SYSTOLIC BLOOD PRESSURE: 126 MMHG | BODY MASS INDEX: 47.09 KG/M2 | DIASTOLIC BLOOD PRESSURE: 78 MMHG | WEIGHT: 293 LBS

## 2025-08-01 DIAGNOSIS — Z12.31 ENCOUNTER FOR SCREENING MAMMOGRAM FOR BREAST CANCER: ICD-10-CM

## 2025-08-01 DIAGNOSIS — F33.1 MODERATE EPISODE OF RECURRENT MAJOR DEPRESSIVE DISORDER: ICD-10-CM

## 2025-08-01 DIAGNOSIS — Z01.419 WELL WOMAN EXAM WITH ROUTINE GYNECOLOGICAL EXAM: Primary | ICD-10-CM

## 2025-08-01 DIAGNOSIS — N76.0 BV (BACTERIAL VAGINOSIS): ICD-10-CM

## 2025-08-01 DIAGNOSIS — B96.89 BV (BACTERIAL VAGINOSIS): ICD-10-CM

## 2025-08-01 PROCEDURE — 87491 CHLMYD TRACH DNA AMP PROBE: CPT | Performed by: NURSE PRACTITIONER

## 2025-08-01 PROCEDURE — 87661 TRICHOMONAS VAGINALIS AMPLIF: CPT | Performed by: NURSE PRACTITIONER

## 2025-08-01 PROCEDURE — 81513 NFCT DS BV RNA VAG FLU ALG: CPT | Performed by: NURSE PRACTITIONER

## 2025-08-01 PROCEDURE — 87481 CANDIDA DNA AMP PROBE: CPT | Performed by: NURSE PRACTITIONER

## 2025-08-01 PROCEDURE — 87591 N.GONORRHOEAE DNA AMP PROB: CPT | Performed by: NURSE PRACTITIONER

## 2025-08-01 PROCEDURE — 87563 M. GENITALIUM AMP PROBE: CPT | Performed by: NURSE PRACTITIONER

## 2025-08-01 RX ORDER — BUSPIRONE HYDROCHLORIDE 7.5 MG/1
7.5 TABLET ORAL 3 TIMES DAILY
Qty: 270 TABLET | Refills: 0 | Status: SHIPPED | OUTPATIENT
Start: 2025-08-01

## 2025-08-01 NOTE — PROGRESS NOTES
"Chief Complaint   Patient presents with    Gynecologic Exam     Patient here for annual, last pap 7/9/24, mammogram 8/2/24. Patient reports in December and March tested positive for Bv and it keeps coming back.         Subjective     Miley Villalba is a 39 y.o. female    History of Present Illness  Pt comes in today for annual wellness exam. Main complaint is that of recurrent vaginal infections.     /78 (BP Location: Left arm, Patient Position: Sitting, Cuff Size: Large Adult)   Ht 167.6 cm (66\")   Wt (!) 137 kg (302 lb)   LMP 07/21/2025   BMI 48.74 kg/m²     Outpatient Encounter Medications as of 8/1/2025   Medication Sig Dispense Refill    citalopram (CeleXA) 40 MG tablet Take 1 tablet by mouth once daily (Patient taking differently: Patient is tapering dose to stop) 90 tablet 1    clobetasol (TEMOVATE) 0.05 % ointment Apply 1 Application topically to the appropriate area as directed 2 (Two) Times a Day. 60 g 1    OXcarbazepine (TRILEPTAL) 150 MG tablet Take 1 tablet by mouth twice daily 180 tablet 0    prazosin (MINIPRESS) 1 MG capsule TAKE 1 CAPSULE BY MOUTH ONCE DAILY AT NIGHT 90 capsule 2    [DISCONTINUED] busPIRone (BUSPAR) 7.5 MG tablet TAKE 1 TABLET BY MOUTH THREE TIMES DAILY 270 tablet 0     No facility-administered encounter medications on file as of 8/1/2025.       Past Medical History  Past Medical History:   Diagnosis Date    Other sleep apnea         Surgical History  Past Surgical History:   Procedure Laterality Date    KNEE CARTILAGE SURGERY Right        Family History  Family History   Problem Relation Age of Onset    Diabetes Father     Alzheimer's disease Mother     Melanoma Maternal Grandfather     Diabetes Maternal Aunt     Diabetes Maternal Uncle     Diabetes Paternal Aunt     Diabetes Paternal Uncle        The following portions of the patient's history were reviewed and updated as appropriate: allergies, current medications, past family history, past medical history, past social " history, past surgical history, and problem list.    Review of Systems   Constitutional:  Negative for activity change and unexpected weight loss.   HENT:  Negative for congestion.    Cardiovascular:  Negative for chest pain.   Gastrointestinal:  Negative for blood in stool, constipation and diarrhea.   Endocrine: Negative for cold intolerance and heat intolerance.   Genitourinary:  Negative for dyspareunia, pelvic pain and vaginal discharge.   Musculoskeletal:  Negative for arthralgias, back pain, neck pain and neck stiffness.   Skin:  Negative for rash.   Neurological:  Negative for dizziness and headache.   Psychiatric/Behavioral:  Negative for sleep disturbance. The patient is not nervous/anxious.        Objective   Physical Exam  Vitals and nursing note reviewed. Exam conducted with a chaperone present.   Constitutional:       General: She is not in acute distress.     Appearance: She is well-developed. She is not diaphoretic.   HENT:      Head: Normocephalic.      Right Ear: External ear normal.      Left Ear: External ear normal.      Nose: Nose normal.   Eyes:      General: No scleral icterus.        Right eye: No discharge.         Left eye: No discharge.      Conjunctiva/sclera: Conjunctivae normal.      Pupils: Pupils are equal, round, and reactive to light.   Neck:      Thyroid: No thyromegaly.      Vascular: No carotid bruit.      Trachea: No tracheal deviation.   Cardiovascular:      Rate and Rhythm: Normal rate and regular rhythm.      Heart sounds: Normal heart sounds. No murmur heard.  Pulmonary:      Effort: Pulmonary effort is normal. No respiratory distress.      Breath sounds: Normal breath sounds. No wheezing.   Chest:   Breasts:     Breasts are symmetrical.      Right: Normal. No swelling, bleeding, inverted nipple, mass, nipple discharge, skin change or tenderness.      Left: Normal. No swelling, bleeding, inverted nipple, mass, nipple discharge, skin change or tenderness.   Abdominal:       General: There is no distension.      Palpations: Abdomen is soft. There is no mass.      Tenderness: There is no abdominal tenderness. There is no right CVA tenderness, left CVA tenderness or guarding.      Hernia: No hernia is present. There is no hernia in the left inguinal area or right inguinal area.   Genitourinary:     General: Normal vulva.      Exam position: Lithotomy position.      Labia:         Right: No rash, tenderness, lesion or injury.         Left: No rash, tenderness, lesion or injury.       Vagina: Normal. No signs of injury and foreign body. No vaginal discharge, erythema, tenderness or bleeding.      Cervix: Normal.      Uterus: Normal. Not enlarged, not fixed and not tender.       Adnexa: Right adnexa normal and left adnexa normal.        Right: No mass, tenderness or fullness.          Left: No mass, tenderness or fullness.        Rectum: Normal. No mass.      Comments:   BSU normal  Urethral meatus  Normal  Perineum  Normal  Musculoskeletal:         General: No tenderness. Normal range of motion.      Cervical back: Normal range of motion and neck supple.   Lymphadenopathy:      Head:      Right side of head: No submental, submandibular, tonsillar, preauricular, posterior auricular or occipital adenopathy.      Left side of head: No submental, submandibular, tonsillar, preauricular, posterior auricular or occipital adenopathy.      Cervical: No cervical adenopathy.      Right cervical: No superficial, deep or posterior cervical adenopathy.     Left cervical: No superficial, deep or posterior cervical adenopathy.      Upper Body:      Right upper body: No supraclavicular, axillary or pectoral adenopathy.      Left upper body: No supraclavicular, axillary or pectoral adenopathy.      Lower Body: No right inguinal adenopathy. No left inguinal adenopathy.   Skin:     General: Skin is warm and dry.      Findings: No bruising, erythema or rash.   Neurological:      Mental Status: She is alert and  oriented to person, place, and time.      Coordination: Coordination normal.   Psychiatric:         Mood and Affect: Mood normal.         Behavior: Behavior normal.         Thought Content: Thought content normal.         Judgment: Judgment normal.         Assessment & Plan   Diagnoses and all orders for this visit:    1. Well woman exam with routine gynecological exam (Primary)    2. BV (bacterial vaginosis)  -     Bacterial Vaginosis Panel Plus (PAD)    3. Encounter for screening mammogram for breast cancer  -     Mammo Screening Digital Tomosynthesis Bilateral With CAD; Future         Normal GYN exam. Encouraged SBE, pt is aware how to do self breast exam and the importance of same. Discussed weight management and importance of maintaining a healthy weight. Discussed Vitamin D intake and the importance of adequate vitamin D for both bone health and a healthy immune system.  Discussed daily exercise and the importance of same, in regards to a healthy heart as well as helping to maintain her weight and improving her mental health.  Colonoscopy is not indicated. Mammogram will be scheduled. Bone density is not indicated. Pap smear is not done per ASCCP guidelines. HPV is not done. Lab work up is up to date with PCP.      Pt complains of recurrent vaginal infections. Discussed probiotic. Culture collected.     Periods normal. Pt declines birth control.       Savannah Potter, APRN  8/1/2025    No follow-ups on file.

## 2025-08-01 NOTE — TELEPHONE ENCOUNTER
Rx Refill Note  Requested Prescriptions     Pending Prescriptions Disp Refills    busPIRone (BUSPAR) 7.5 MG tablet [Pharmacy Med Name: busPIRone HCl 7.5 MG Oral Tablet] 270 tablet 0     Sig: TAKE 1 TABLET BY MOUTH THREE TIMES DAILY      Last office visit with prescribing clinician: 2/27/2025   Last telemedicine visit with prescribing clinician: Visit date not found   Next office visit with prescribing clinician: Visit date not found     Al Reynolds MA  08/01/25, 14:56 CDT

## 2025-08-04 LAB
BACTERIAL VAGINITIS (PAD PANTHER): POSITIVE
C TRACH RRNA SPEC QL NAA+PROBE: NOT DETECTED
CANDIDA GLABRATA (PAD PANTHER): NEGATIVE
CANDIDA SPECIES (PAD PANTHER): NEGATIVE
MYCOPLASMA GENITALIUM (PAD PANTHER): NEGATIVE
N GONORRHOEA RRNA SPEC QL NAA+PROBE: NOT DETECTED
TRICHOMONAS VAGINALIS (PAD PANTHER): NEGATIVE

## 2025-08-12 ENCOUNTER — OFFICE VISIT (OUTPATIENT)
Dept: FAMILY MEDICINE CLINIC | Facility: CLINIC | Age: 40
End: 2025-08-12
Payer: COMMERCIAL

## 2025-08-12 VITALS
HEART RATE: 78 BPM | OXYGEN SATURATION: 98 % | WEIGHT: 293 LBS | SYSTOLIC BLOOD PRESSURE: 122 MMHG | TEMPERATURE: 97.8 F | DIASTOLIC BLOOD PRESSURE: 82 MMHG | HEIGHT: 66 IN | BODY MASS INDEX: 47.09 KG/M2

## 2025-08-12 DIAGNOSIS — G43.909 EPISODIC MIGRAINE: ICD-10-CM

## 2025-08-12 DIAGNOSIS — L23.7 POISON IVY: Primary | ICD-10-CM

## 2025-08-12 RX ORDER — TRIAMCINOLONE ACETONIDE 40 MG/ML
80 INJECTION, SUSPENSION INTRA-ARTICULAR; INTRAMUSCULAR ONCE
Status: COMPLETED | OUTPATIENT
Start: 2025-08-12 | End: 2025-08-12

## 2025-08-12 RX ORDER — RIZATRIPTAN BENZOATE 10 MG/1
10 TABLET ORAL ONCE AS NEEDED
Qty: 9 TABLET | Refills: 0 | Status: SHIPPED | OUTPATIENT
Start: 2025-08-12

## 2025-08-12 RX ADMIN — TRIAMCINOLONE ACETONIDE 80 MG: 40 INJECTION, SUSPENSION INTRA-ARTICULAR; INTRAMUSCULAR at 14:46
